# Patient Record
Sex: MALE | Race: WHITE
[De-identification: names, ages, dates, MRNs, and addresses within clinical notes are randomized per-mention and may not be internally consistent; named-entity substitution may affect disease eponyms.]

---

## 2017-04-24 ENCOUNTER — HOSPITAL ENCOUNTER (INPATIENT)
Dept: HOSPITAL 17 - PHED | Age: 62
LOS: 2 days | Discharge: HOME | DRG: 871 | End: 2017-04-26
Attending: HOSPITALIST | Admitting: HOSPITALIST
Payer: OTHER GOVERNMENT

## 2017-04-24 VITALS
RESPIRATION RATE: 22 BRPM | HEART RATE: 97 BPM | DIASTOLIC BLOOD PRESSURE: 67 MMHG | OXYGEN SATURATION: 95 % | TEMPERATURE: 96.7 F | SYSTOLIC BLOOD PRESSURE: 114 MMHG

## 2017-04-24 VITALS — WEIGHT: 182.1 LBS | BODY MASS INDEX: 26.97 KG/M2 | HEIGHT: 69 IN

## 2017-04-24 VITALS
OXYGEN SATURATION: 96 % | HEART RATE: 92 BPM | SYSTOLIC BLOOD PRESSURE: 163 MMHG | TEMPERATURE: 98 F | RESPIRATION RATE: 18 BRPM | DIASTOLIC BLOOD PRESSURE: 86 MMHG

## 2017-04-24 VITALS
DIASTOLIC BLOOD PRESSURE: 67 MMHG | OXYGEN SATURATION: 95 % | HEART RATE: 107 BPM | RESPIRATION RATE: 26 BRPM | SYSTOLIC BLOOD PRESSURE: 127 MMHG

## 2017-04-24 VITALS
DIASTOLIC BLOOD PRESSURE: 75 MMHG | RESPIRATION RATE: 28 BRPM | SYSTOLIC BLOOD PRESSURE: 147 MMHG | TEMPERATURE: 98.3 F | OXYGEN SATURATION: 99 % | HEART RATE: 127 BPM

## 2017-04-24 VITALS
DIASTOLIC BLOOD PRESSURE: 65 MMHG | HEART RATE: 109 BPM | RESPIRATION RATE: 28 BRPM | OXYGEN SATURATION: 94 % | SYSTOLIC BLOOD PRESSURE: 137 MMHG

## 2017-04-24 VITALS
SYSTOLIC BLOOD PRESSURE: 161 MMHG | TEMPERATURE: 98.5 F | OXYGEN SATURATION: 89 % | HEART RATE: 122 BPM | RESPIRATION RATE: 36 BRPM | DIASTOLIC BLOOD PRESSURE: 90 MMHG

## 2017-04-24 VITALS
HEART RATE: 97 BPM | OXYGEN SATURATION: 95 % | SYSTOLIC BLOOD PRESSURE: 139 MMHG | RESPIRATION RATE: 18 BRPM | DIASTOLIC BLOOD PRESSURE: 70 MMHG

## 2017-04-24 VITALS
SYSTOLIC BLOOD PRESSURE: 143 MMHG | HEART RATE: 127 BPM | DIASTOLIC BLOOD PRESSURE: 72 MMHG | OXYGEN SATURATION: 99 % | RESPIRATION RATE: 28 BRPM

## 2017-04-24 VITALS — OXYGEN SATURATION: 93 %

## 2017-04-24 VITALS — OXYGEN SATURATION: 95 %

## 2017-04-24 DIAGNOSIS — K74.60: ICD-10-CM

## 2017-04-24 DIAGNOSIS — J18.9: ICD-10-CM

## 2017-04-24 DIAGNOSIS — J44.0: ICD-10-CM

## 2017-04-24 DIAGNOSIS — J44.1: ICD-10-CM

## 2017-04-24 DIAGNOSIS — F17.210: ICD-10-CM

## 2017-04-24 DIAGNOSIS — R65.20: ICD-10-CM

## 2017-04-24 DIAGNOSIS — J98.11: ICD-10-CM

## 2017-04-24 DIAGNOSIS — A41.9: Primary | ICD-10-CM

## 2017-04-24 DIAGNOSIS — K21.9: ICD-10-CM

## 2017-04-24 DIAGNOSIS — I10: ICD-10-CM

## 2017-04-24 DIAGNOSIS — E03.9: ICD-10-CM

## 2017-04-24 LAB
ALP SERPL-CCNC: 83 U/L (ref 45–117)
ALT SERPL-CCNC: 31 U/L (ref 12–78)
ANION GAP SERPL CALC-SCNC: 6 MEQ/L (ref 5–15)
AST SERPL-CCNC: 22 U/L (ref 15–37)
BASOPHILS # BLD AUTO: 0.2 TH/MM3 (ref 0–0.2)
BASOPHILS NFR BLD: 1.4 % (ref 0–2)
BILIRUB SERPL-MCNC: 1.6 MG/DL (ref 0.2–1)
BUN SERPL-MCNC: 18 MG/DL (ref 7–18)
CHLORIDE SERPL-SCNC: 106 MEQ/L (ref 98–107)
EOSINOPHIL # BLD: 0 TH/MM3 (ref 0–0.4)
EOSINOPHIL NFR BLD: 0.3 % (ref 0–4)
ERYTHROCYTE [DISTWIDTH] IN BLOOD BY AUTOMATED COUNT: 11.9 % (ref 11.6–17.2)
GFR SERPLBLD BASED ON 1.73 SQ M-ARVRAT: 62 ML/MIN (ref 89–?)
HCO3 BLD-SCNC: 27.6 MEQ/L (ref 21–32)
HCT VFR BLD CALC: 42.8 % (ref 39–51)
HEMO FLAGS: (no result)
LACTIC ACID GHOST: (no result)
LYMPHOCYTES # BLD AUTO: 3.8 TH/MM3 (ref 1–4.8)
LYMPHOCYTES NFR BLD AUTO: 24.1 % (ref 9–44)
MCH RBC QN AUTO: 31.5 PG (ref 27–34)
MCHC RBC AUTO-ENTMCNC: 34.1 % (ref 32–36)
MCV RBC AUTO: 92.5 FL (ref 80–100)
MONOCYTES NFR BLD: 5.7 % (ref 0–8)
NEUTROPHILS # BLD AUTO: 10.7 TH/MM3 (ref 1.8–7.7)
NEUTROPHILS NFR BLD AUTO: 68.5 % (ref 16–70)
PLATELET # BLD: 311 TH/MM3 (ref 150–450)
POTASSIUM SERPL-SCNC: 4.4 MEQ/L (ref 3.5–5.1)
RBC # BLD AUTO: 4.63 MIL/MM3 (ref 4.5–5.9)
SODIUM SERPL-SCNC: 140 MEQ/L (ref 136–145)
WBC # BLD AUTO: 15.6 TH/MM3 (ref 4–11)

## 2017-04-24 PROCEDURE — 93005 ELECTROCARDIOGRAM TRACING: CPT

## 2017-04-24 PROCEDURE — 80048 BASIC METABOLIC PNL TOTAL CA: CPT

## 2017-04-24 PROCEDURE — 94664 DEMO&/EVAL PT USE INHALER: CPT

## 2017-04-24 PROCEDURE — 94640 AIRWAY INHALATION TREATMENT: CPT

## 2017-04-24 PROCEDURE — 96375 TX/PRO/DX INJ NEW DRUG ADDON: CPT

## 2017-04-24 PROCEDURE — 87804 INFLUENZA ASSAY W/OPTIC: CPT

## 2017-04-24 PROCEDURE — 84484 ASSAY OF TROPONIN QUANT: CPT

## 2017-04-24 PROCEDURE — 71010: CPT

## 2017-04-24 PROCEDURE — 87040 BLOOD CULTURE FOR BACTERIA: CPT

## 2017-04-24 PROCEDURE — 96361 HYDRATE IV INFUSION ADD-ON: CPT

## 2017-04-24 PROCEDURE — 83605 ASSAY OF LACTIC ACID: CPT

## 2017-04-24 PROCEDURE — 80053 COMPREHEN METABOLIC PANEL: CPT

## 2017-04-24 PROCEDURE — 96374 THER/PROPH/DIAG INJ IV PUSH: CPT

## 2017-04-24 PROCEDURE — 85025 COMPLETE CBC W/AUTO DIFF WBC: CPT

## 2017-04-24 RX ADMIN — LEVOFLOXACIN SCH MG: 750 TABLET, FILM COATED ORAL at 18:42

## 2017-04-24 RX ADMIN — METHYLPREDNISOLONE SODIUM SUCCINATE SCH MG: 40 INJECTION, POWDER, FOR SOLUTION INTRAMUSCULAR; INTRAVENOUS at 19:57

## 2017-04-24 RX ADMIN — ENOXAPARIN SODIUM SCH MG: 40 INJECTION SUBCUTANEOUS at 18:42

## 2017-04-24 RX ADMIN — IPRATROPIUM BROMIDE AND ALBUTEROL SULFATE SCH AMPULE: .5; 3 SOLUTION RESPIRATORY (INHALATION) at 19:47

## 2017-04-24 RX ADMIN — Medication SCH ML: at 19:58

## 2017-04-24 RX ADMIN — IPRATROPIUM BROMIDE AND ALBUTEROL SULFATE SCH AMPULE: .5; 3 SOLUTION RESPIRATORY (INHALATION) at 13:43

## 2017-04-24 RX ADMIN — CEFTRIAXONE SODIUM SCH MLS/HR: 2 INJECTION, POWDER, FOR SOLUTION INTRAMUSCULAR; INTRAVENOUS at 21:06

## 2017-04-24 NOTE — PD
HPI


Chief Complaint:  Respiratory Symptoms


Time Seen by Provider:  13:37


Travel History


International Travel<30 days:  No


Contact w/Intl Traveler<30days:  No





History of Present Illness


HPI


This is a 61-year-old male who has a history of COPD who presents to the 

emergency department with increasing shortness of breath over the past 2 days, 

constant, moderate severity associated with a productive cough with yellow 

sputum and some subjective fevers and chills.  He says a family member was sick 

with similar symptoms.  He denies any chest pain.





PFSH


Past Medical History


Hx Anticoagulant Therapy:  No


Arthritis:  Yes


Autoimmune Disease:  Yes (LUPUS)


Cancer:  Yes (ABDOMINAL, right hand middle finger REMOVED)


Cardiovascular Problems:  No


Chemotherapy:  No


Cirrhosis:  Yes


COPD:  Yes


Cerebrovascular Accident:  No


Diabetes:  No


Diminished Hearing:  No


GERD:  Yes


Hepatitis:  No


Hiatal Hernia:  Yes


Hypertension:  Yes


Respiratory:  Yes (COPD)


Thyroid Disease:  Yes


PNEUMOCCOCAL Vaccine (Year):  2011





Past Surgical History


Abdominal Surgery:  Yes (WHIPPLE DID INCLUDE SPLENECTOMY,PART OF PANCREAS,ALSO 

HAD HERNIA REPAIR)


Appendectomy:  Yes


Cholecystectomy:  Yes


Genitourinary Surgery:  Yes (PROSTATE SURGERY)


Pacemaker:  No


Other Surgery:  Yes (CANCEROUS SKIN LESIONS REMOVED FROM UPPER BACK AND ARMS 

2014)





Social History


Alcohol Use:  No (QUIT IN 2008)


Tobacco Use:  Yes (1 ppd)


Substance Use:  No





Allergies-Medications


(Allergen,Severity, Reaction):  


Uncoded Allergies:  


     STEROIDS (Adverse Reaction, Severe, 2/17/14)


 SEVERE CRAMPING IN LEGS AND ARMS


Reported Meds & Prescriptions





Reported Meds & Active Scripts


Active


Reported


Aspirin 81 Mg Tabdr 81 Mg PO DAILY


Ventolin Hfa 18 GM Inh (Albuterol Sulfate) 90 Mcg/Act Aer 2 Puff INH Q4-6H PRN


Synthroid (Levothyroxine Sodium) 50 Mcg Tab 50 Mcg PO DAILY








Review of Systems


ROS Limitations:  Clinical Condition





Physical Exam


Narrative


GENERAL: Moderate respiratory distress


SKIN: Focused skin assessment warm and dry.


HEAD: Atraumatic. Normocephalic. 


EYES: Pupils equal and round.  No injection or drainage. 


ENT:  Moist mucous membranes


NECK: Trachea midline. 


CARDIOVASCULAR: Regular rate and rhythm.  No murmur appreciated.


RESPIRATORY: Diffuse wheezing with accessory muscle use, speaking 2-3 word 

sentences


GASTROINTESTINAL: Abdomen soft, non-tender, nondistended. 


MUSCULOSKELETAL: No obvious deformities. 


NEUROLOGICAL: Awake and alert. No obvious cranial nerve deficits.  Moving all 

extremities.


PSYCHIATRIC: Appropriate mood and affect; insight and judgment normal.





Data


Data


Last Documented VS





Vital Signs








  Date Time  Temp Pulse Resp B/P Pulse Ox O2 Delivery O2 Flow Rate FiO2


 


4/24/17 14:40  109 28 137/65 94 Nasal Cannula 3 


 


4/24/17 14:15 98.3       








Orders





 Complete Blood Count With Diff (4/24/17 13:37)


Comprehensive Metabolic Panel (4/24/17 13:37)


Troponin I (4/24/17 13:37)


Iv Access Insert/Monitor (4/24/17 13:37)


Electrocardiogram (4/24/17 13:37)


Ecg Monitoring (4/24/17 13:37)


Oximetry (4/24/17 13:37)


Oxygen Administration (4/24/17 13:37)


Chest, Single Ap (4/24/17 13:37)


Sodium Chloride 0.9% Flush (Ns Flush) (4/24/17 13:45)


Methylprednisolone So Succ Inj (Solumedr (4/24/17 13:45)


Albuterol-Ipratropium Neb (Duoneb Neb) (4/24/17 13:45)


Sodium Chlor 0.9% 1000 Ml Inj (Ns 1000 M (4/24/17 13:45)


Albuterol-Ipratropium Neb (Duoneb Neb) (4/24/17 13:39)


Azithromycin Inj (Zithromax Inj) (4/24/17 15:15)


Lactic Acid (4/24/17 15:10)


Blood Culture (4/24/17 15:10)


Sodium Chlor 0.9% 1000 Ml Inj (Ns 1000 M (4/24/17 15:15)





Labs








 Laboratory Tests








Test 4/24/17





 13:30


 


White Blood Count 15.6 TH/MM3


 


Red Blood Count 4.63 MIL/MM3


 


Hemoglobin 14.6 GM/DL


 


Hematocrit 42.8 %


 


Mean Corpuscular Volume 92.5 FL


 


Mean Corpuscular Hemoglobin 31.5 PG


 


Mean Corpuscular Hemoglobin 34.1 %





Concent 


 


Red Cell Distribution Width 11.9 %


 


Platelet Count 311 TH/MM3


 


Mean Platelet Volume 8.4 FL


 


Neutrophils (%) (Auto) 68.5 %


 


Lymphocytes (%) (Auto) 24.1 %


 


Monocytes (%) (Auto) 5.7 %


 


Eosinophils (%) (Auto) 0.3 %


 


Basophils (%) (Auto) 1.4 %


 


Neutrophils # (Auto) 10.7 TH/MM3


 


Lymphocytes # (Auto) 3.8 TH/MM3


 


Monocytes # (Auto) 0.9 TH/MM3


 


Eosinophils # (Auto) 0.0 TH/MM3


 


Basophils # (Auto) 0.2 TH/MM3


 


CBC Comment DIFF FINAL 


 


Differential Comment  


 


Sodium Level 140 MEQ/L


 


Potassium Level 4.4 MEQ/L


 


Chloride Level 106 MEQ/L


 


Carbon Dioxide Level 27.6 MEQ/L


 


Anion Gap 6 MEQ/L


 


Blood Urea Nitrogen 18 MG/DL


 


Creatinine 1.20 MG/DL


 


Estimat Glomerular Filtration 62 ML/MIN





Rate 


 


Random Glucose 102 MG/DL


 


Calcium Level 9.0 MG/DL


 


Total Bilirubin 1.6 MG/DL


 


Aspartate Amino Transf 22 U/L





(AST/SGOT) 


 


Alanine Aminotransferase 31 U/L





(ALT/SGPT) 


 


Alkaline Phosphatase 83 U/L


 


Troponin I LESS THAN 0.02





 NG/ML


 


Total Protein 8.7 GM/DL


 


Albumin 4.1 GM/DL














MDM


Medical Decision Making


Medical Screen Exam Complete:  Yes


Emergency Medical Condition:  Yes


Interpretation(s)


Afebrile, tachycardic, tachypneic, hypertensive, hypoxic


Leukocytosis


Electrolytes are reassuring


Troponin is normal





Last 24 hours Impressions








Chest X-Ray 4/24/17 1337 Signed





Impressions: 





 Service Date/Time:  Monday, April 24, 2017 13:57 - CONCLUSION:  1. Minimal 





 atelectasis or scarring in the left lingular region. 2. Otherwise, no acute 





 cardiopulmonary process.     Bernardino Watson MD 








Differential Diagnosis


Pneumonia, COPD exacerbation, viral syndrome, influenza, pulmonary embolism


Narrative Course


This is a 61-year-old male who presents to the emergency department with 

shortness of breath.  He has a history of COPD.  On arrival he had accessory 

muscle use and was diffusely wheezing.  He was given serial DuoNeb's and IV 

steroids.  His symptoms improved significantly.  He continues to be 89 and 90% 

on room air.  I think he would benefit from admission for continued respiratory 

management.  He does have a leukocytosis of 15 which I suspect may be 

demargination in the setting of respiratory distress, or secondary to e a viral 

syndrome.  There is no evidence of pneumonia on chest x-ray.  He was covered 

with azithromycin in the setting of increased sputum production.





Diagnosis





 Primary Impression:  


 COPD exacerbation





Admitting Information


Admitting Physician Requests:  Admit








Keisha Pino MD Apr 24, 2017 13:41

## 2017-04-24 NOTE — RADHPO
EXAM DATE/TIME:  04/24/2017 13:57 

 

[facility] COMPARISON:     

CHEST SINGLE AP, October 29, 2016, 19:53.

 

                     

INDICATIONS :     

Shortness of breath and cough. 

                     

 

MEDICAL HISTORY :     

Hypertension.  Chronic obstructive pulmonary disease.  Lupus.      

 

SURGICAL HISTORY :        

Whipple. Hiatal hernia repair. 

 

ENCOUNTER:     

Initial                                        

 

ACUITY:     

3 days      

 

PAIN SCORE:     

0/10

 

LOCATION:     

Bilateral chest 

 

FINDINGS:     

A single view of the chest demonstrates the lungs to be symmetrically aerated without evidence of mas
s, infiltrate or effusion.  Minimal atelectasis or scarring in the region of the left lingula. No eff
usion. The cardiomediastinal contours are unremarkable.  Osseous structures are intact. Anterior fixa
tion of the lower cervical spine.

 

CONCLUSION:     

1. Minimal atelectasis or scarring in the left lingular region.

2. Otherwise, no acute cardiopulmonary process.

 

 

 

 Bernardino Watson MD on April 24, 2017 at 15:01           

Board Certified Radiologist.

 This report was verified electronically.

## 2017-04-25 VITALS
DIASTOLIC BLOOD PRESSURE: 78 MMHG | SYSTOLIC BLOOD PRESSURE: 161 MMHG | RESPIRATION RATE: 20 BRPM | OXYGEN SATURATION: 94 % | TEMPERATURE: 97.9 F | HEART RATE: 95 BPM

## 2017-04-25 VITALS — OXYGEN SATURATION: 92 %

## 2017-04-25 VITALS
DIASTOLIC BLOOD PRESSURE: 81 MMHG | OXYGEN SATURATION: 95 % | SYSTOLIC BLOOD PRESSURE: 157 MMHG | TEMPERATURE: 96.1 F | HEART RATE: 89 BPM | RESPIRATION RATE: 20 BRPM

## 2017-04-25 VITALS
DIASTOLIC BLOOD PRESSURE: 80 MMHG | TEMPERATURE: 96.8 F | RESPIRATION RATE: 20 BRPM | SYSTOLIC BLOOD PRESSURE: 143 MMHG | OXYGEN SATURATION: 96 % | HEART RATE: 83 BPM

## 2017-04-25 VITALS — OXYGEN SATURATION: 96 %

## 2017-04-25 VITALS — OXYGEN SATURATION: 93 %

## 2017-04-25 VITALS
TEMPERATURE: 96.5 F | DIASTOLIC BLOOD PRESSURE: 66 MMHG | HEART RATE: 99 BPM | OXYGEN SATURATION: 93 % | SYSTOLIC BLOOD PRESSURE: 116 MMHG | RESPIRATION RATE: 20 BRPM

## 2017-04-25 VITALS
RESPIRATION RATE: 20 BRPM | SYSTOLIC BLOOD PRESSURE: 131 MMHG | HEART RATE: 107 BPM | DIASTOLIC BLOOD PRESSURE: 74 MMHG | TEMPERATURE: 99.1 F | OXYGEN SATURATION: 93 %

## 2017-04-25 VITALS
OXYGEN SATURATION: 96 % | HEART RATE: 90 BPM | RESPIRATION RATE: 20 BRPM | DIASTOLIC BLOOD PRESSURE: 64 MMHG | TEMPERATURE: 96.7 F | SYSTOLIC BLOOD PRESSURE: 121 MMHG

## 2017-04-25 LAB
ANION GAP SERPL CALC-SCNC: 9 MEQ/L (ref 5–15)
BASOPHILS # BLD AUTO: 0.5 TH/MM3 (ref 0–0.2)
BASOPHILS NFR BLD: 3.5 % (ref 0–2)
BUN SERPL-MCNC: 24 MG/DL (ref 7–18)
CHLORIDE SERPL-SCNC: 107 MEQ/L (ref 98–107)
EOSINOPHIL # BLD: 0 TH/MM3 (ref 0–0.4)
EOSINOPHIL NFR BLD: 0.2 % (ref 0–4)
ERYTHROCYTE [DISTWIDTH] IN BLOOD BY AUTOMATED COUNT: 12.6 % (ref 11.6–17.2)
GFR SERPLBLD BASED ON 1.73 SQ M-ARVRAT: 68 ML/MIN (ref 89–?)
HCO3 BLD-SCNC: 25.9 MEQ/L (ref 21–32)
HCT VFR BLD CALC: 35.7 % (ref 39–51)
HEMO FLAGS: (no result)
LYMPHOCYTES # BLD AUTO: 2 TH/MM3 (ref 1–4.8)
LYMPHOCYTES NFR BLD AUTO: 14.5 % (ref 9–44)
MCH RBC QN AUTO: 32.1 PG (ref 27–34)
MCHC RBC AUTO-ENTMCNC: 34.3 % (ref 32–36)
MCV RBC AUTO: 93.5 FL (ref 80–100)
MONOCYTES NFR BLD: 4.5 % (ref 0–8)
NEUTROPHILS # BLD AUTO: 10.5 TH/MM3 (ref 1.8–7.7)
NEUTROPHILS NFR BLD AUTO: 77.3 % (ref 16–70)
PLATELET # BLD: 274 TH/MM3 (ref 150–450)
POTASSIUM SERPL-SCNC: 3.8 MEQ/L (ref 3.5–5.1)
RBC # BLD AUTO: 3.81 MIL/MM3 (ref 4.5–5.9)
SODIUM SERPL-SCNC: 142 MEQ/L (ref 136–145)
WBC # BLD AUTO: 13.6 TH/MM3 (ref 4–11)

## 2017-04-25 RX ADMIN — IPRATROPIUM BROMIDE AND ALBUTEROL SULFATE SCH AMPULE: .5; 3 SOLUTION RESPIRATORY (INHALATION) at 07:35

## 2017-04-25 RX ADMIN — ASPIRIN SCH MG: 81 TABLET ORAL at 09:38

## 2017-04-25 RX ADMIN — IPRATROPIUM BROMIDE AND ALBUTEROL SULFATE SCH AMPULE: .5; 3 SOLUTION RESPIRATORY (INHALATION) at 19:24

## 2017-04-25 RX ADMIN — CEFTRIAXONE SODIUM SCH MLS/HR: 2 INJECTION, POWDER, FOR SOLUTION INTRAMUSCULAR; INTRAVENOUS at 21:31

## 2017-04-25 RX ADMIN — METHYLPREDNISOLONE SODIUM SUCCINATE SCH MG: 40 INJECTION, POWDER, FOR SOLUTION INTRAMUSCULAR; INTRAVENOUS at 09:38

## 2017-04-25 RX ADMIN — IPRATROPIUM BROMIDE AND ALBUTEROL SULFATE SCH AMPULE: .5; 3 SOLUTION RESPIRATORY (INHALATION) at 15:02

## 2017-04-25 RX ADMIN — METHYLPREDNISOLONE SODIUM SUCCINATE SCH MG: 40 INJECTION, POWDER, FOR SOLUTION INTRAMUSCULAR; INTRAVENOUS at 02:12

## 2017-04-25 RX ADMIN — IPRATROPIUM BROMIDE AND ALBUTEROL SULFATE SCH AMPULE: .5; 3 SOLUTION RESPIRATORY (INHALATION) at 11:27

## 2017-04-25 RX ADMIN — ENOXAPARIN SODIUM SCH MG: 40 INJECTION SUBCUTANEOUS at 17:03

## 2017-04-25 RX ADMIN — Medication SCH ML: at 09:00

## 2017-04-25 RX ADMIN — LEVOTHYROXINE SODIUM SCH MCG: 50 TABLET ORAL at 05:17

## 2017-04-25 RX ADMIN — LEVOFLOXACIN SCH MG: 750 TABLET, FILM COATED ORAL at 17:03

## 2017-04-25 RX ADMIN — Medication SCH ML: at 21:31

## 2017-04-25 NOTE — HHI.HP
__________________________________________________





HPI


Service


[facility]


Primary Care Physician


No Primary Care Physician


Admission Diagnosis


copd exacerbation


Diagnoses:  


Chief Complaint:  


Fever, cough, shortness of breath.


Travel History


International Travel<30 Days:  No


Contact w/Intl Traveler <30 Da:  No


Traveled to Known Affected Are:  No





Sepsis Criteria


SIRS Criteria (2 or more):  Heart rate over 90, RR  > 20 or PaCO2 < 32, WBC > 

26215, < 4000 or > 10% bands


Sepsis Criteria (SIRS+source):  Infect source susp/known


Severe Sepsis (+one):  Lactate >2


Criteria Outcome:  Meets SIRS criteria, Meets sepsis criteria, Meets severe 

sepsis criteria


History of Present Illness


Mr. Cruz is a pleasant 61 year old  who presented to the ED on 4/24/ 2017 with productive cough, fever, shortness of breath that started on Saturday 4/22/2017. He reports temp of 101 and also had diarrhea. He denies any chest 

pain. No changes in bladder movements. His family member was sick with similar 

symptoms.  On arrival, Temp 98.5, , Resp 36, /90, 89% sat on room 

air. WBC 15.9K, Lactic acid 3.0 and then 4.9.





Review of Systems


Except as stated in HPI:  all other systems reviewed are Neg





Past Family Social History


Past Medical History


COPD, Hypothyroidism


Past Surgical History


Whipple procedure


Neck surgery


Finger cancer surgery


Hernia surgery


Reported Medications


Aspirin 81 Mg Tabdr 81 Mg PO DAILY


Ventolin Hfa 18 GM Inh (Albuterol Sulfate) 90 Mcg/Act Aer 2 Puff INH Q4-6H PRN


Synthroid (Levothyroxine Sodium) 50 Mcg Tab 50 Mcg PO DAILY


Allergies:  


Coded Allergies:  


     No Known Allergies (Unverified , 4/24/17)


Family History


Mother - dementia


Father - Heart attack


Social History


Smokes 1 ppd, denies using alcohol





Physical Exam


Vital Signs





 Vital Signs








  Date Time  Temp Pulse Resp B/P Pulse Ox O2 Delivery O2 Flow Rate FiO2


 


4/25/17 08:10     93 Nasal Cannula 1.00 


 


4/25/17 08:00 96.8 83 20 143/80 96   


 


4/25/17 07:39     96 Nasal Cannula 2.00 


 


4/25/17 04:00 96.1 89 20 157/81 95   


 


4/25/17 00:00 96.7 90 20 121/64 96   


 


4/24/17 20:00 96.7 97 22 114/67 95   


 


4/24/17 19:48     93 Nasal Cannula 2.00 


 


4/24/17 18:00 98.0 92 18 163/86 96   


 


4/24/17 17:17     95 Nasal Cannula 3.00 


 


4/24/17 17:10  97 18 139/70 95 Nasal Cannula 3 


 


4/24/17 15:44  107 26 127/67 95 Nasal Cannula 3 


 


4/24/17 14:40  109 28 137/65 94 Nasal Cannula 3 


 


4/24/17 14:15 98.3 127 28 147/75 99 Nasal Cannula 3 


 


4/24/17 14:03  127 28 143/72 99 Nasal Cannula 3 


 


4/24/17 13:53  122   89 Nasal Cannula 3 


 


4/24/17 13:53     93 Nasal Cannula  


 


4/24/17 13:53     93 Nasal Cannula 3 


 


4/24/17 13:44 98.5 122 36 161/90 89   


 


4/24/17 13:40     93 Nasal Cannula 3.00 








Physical Exam


GENERAL: This is a well-nourished, well-developed patient, in no apparent 

distress.


SKIN: No rashes, ecchymoses or lesions. Warm and dry.


HEAD: Atraumatic. Normocephalic. No temporal or scalp tenderness.


EYES: Pupils equal round and reactive. No injection or drainage. 


ENT: Nose without bleeding, purulent drainage or septal hematoma. Airway patent.


NECK: Trachea midline. No lymphadenopathy. Supple, nontender, no meningeal 

signs.


CARDIOVASCULAR: Regular rhythm, tachycardia without murmurs, gallops, or rubs. 

No JVD. 


RESPIRATORY: Moderate air entry, No appreciable wheezing, crackles. 


GASTROINTESTINAL: Abdomen soft, non-tender, nondistended. No guarding.


MUSCULOSKELETAL: Extremities without clubbing, cyanosis, or edema. 


NEUROLOGICAL: Awake and alert. Cranial nerves II through XII intact. No focal 

neurological deficits. Normal speech.


Laboratory





Laboratory Tests








Test 4/24/17 4/24/17 4/24/17 4/24/17





 13:30 15:04 20:44 23:00


 


White Blood Count 15.6    


 


Red Blood Count 4.63    


 


Hemoglobin 14.6    


 


Hematocrit 42.8    


 


Mean Corpuscular Volume 92.5    


 


Mean Corpuscular Hemoglobin 31.5    


 


Mean Corpuscular Hemoglobin 34.1    





Concent    


 


Red Cell Distribution Width 11.9    


 


Platelet Count 311    


 


Mean Platelet Volume 8.4    


 


Neutrophils (%) (Auto) 68.5    


 


Lymphocytes (%) (Auto) 24.1    


 


Monocytes (%) (Auto) 5.7    


 


Eosinophils (%) (Auto) 0.3    


 


Basophils (%) (Auto) 1.4    


 


Neutrophils # (Auto) 10.7    


 


Lymphocytes # (Auto) 3.8    


 


Monocytes # (Auto) 0.9    


 


Eosinophils # (Auto) 0.0    


 


Basophils # (Auto) 0.2    


 


CBC Comment DIFF FINAL    


 


Differential Comment     


 


Sodium Level 140    


 


Potassium Level 4.4    


 


Chloride Level 106    


 


Carbon Dioxide Level 27.6    


 


Anion Gap 6    


 


Blood Urea Nitrogen 18    


 


Creatinine 1.20    


 


Estimat Glomerular Filtration 62    





Rate    


 


Random Glucose 102    


 


Calcium Level 9.0    


 


Total Bilirubin 1.6    


 


Aspartate Amino Transf 22    





(AST/SGOT)    


 


Alanine Aminotransferase 31    





(ALT/SGPT)    


 


Alkaline Phosphatase 83    


 


Troponin I LESS THAN 0.02    


 


Total Protein 8.7    


 


Albumin 4.1    


 


Lactic Acid Level  3.0  4.9  3.7 














 Date/Time Procedure Status





Source Growth 


 


 4/24/17 15:35 Influenza Types A,B Antigen (LUIS EDUARDO) - Final Complete





Nasal Washing NEGATIVE FOR FLU A AND B ANTIGEN.... 


 


 4/24/17 13:35 Aerobic Blood Culture Received





Blood Peripheral Pending 





 4/24/17 13:35 Anaerobic Blood Culture Received





Blood Peripheral Pending 








Result Diagram:  


4/24/17 1330                                                                   

             4/24/17 1330





Imaging





Last Impressions








Chest X-Ray 4/24/17 1337 Signed





Impressions: 





 Service Date/Time:  Monday, April 24, 2017 13:57 - CONCLUSION:  1. Minimal 





 atelectasis or scarring in the left lingular region. 2. Otherwise, no acute 





 cardiopulmonary process.     Bernardino Watson MD 











Assessment and Plan


Problem List:  


(1) Severe sepsis


ICD Code:  A41.9


Status:  Acute


(2) COPD exacerbation


ICD Code:  J44.1


Status:  Acute


(3) Hypothyroidism


ICD Code:  E03.9


Status:  Acute


Assessment and Plan


Mr. Cruz is a pleasant 61 year old  with a history of COPD who 

presented to the ED with productive cough, fever, shortness of breath. Lactic 

acid was 3.0, 4.9 on the day of admission. WBC 15.9. He was tachycardic and 

tachypneic. 





- Severe sepsis likely due to pneumonia


- Probable pneumonia


- Probable COPD exacerbation


   - DuoNeb Q4hrs while awake and Q2hrs PRN. Also start Symbicort. 


   - Solu-medrol 40mg Q6hrs and can be switched to PO Prednisone later.


   - Ceftriaxone 2g IV Q24 hrs and Levaquin 750mg PO Qday. 


   - Follow Lactic acid.


   - Supplemental O2 to keep O2 sat > 90%. 





- Hypothyroidism - Continue Levothyroxine 50mcg Qday. 





- Hypertension - Clonidine and Hydralazine PRN





Full code. Lovenox.





Physician Certification


2 Midnight Certification Type:  Admission for Inpatient Services


Order for Inpatient Services


The services are ordered in accordance with Medicare regulations or non-

Medicare payer requirements, as applicable.  In the case of services not 

specified as inpatient-only, they are appropriately provided as inpatient 

services in accordance with the 2-midnight benchmark.


Estimated LOS (days):  2


 days is the estimated time the patient will need to remain in the hospital, 

assuming treatment plan goals are met and no additional complications.


Post-Hospital Plan:  Home


Notes:


Patient improved well. Rest of the treatments can be done outpatient.








Nicolasa Carlos DO Apr 25, 2017 09:53

## 2017-04-25 NOTE — EKG
Date Performed: 04/24/2017       Time Performed: 13:29:50

 

PTAGE:      61 years

 

EKG:      Sinus tachycardia with PVC(s) Leftward axis ST junctional depression is nonspecific Borderl
ine ECG

 

PREVIOUS TRACING       : 10/29/2016 19.39 Compared to previous tracing, heart rate has increased.

 

DOCTOR:   Wade Shine  Interpretating Date/Time  04/25/2017 09:05:26

## 2017-04-26 VITALS
OXYGEN SATURATION: 90 % | HEART RATE: 84 BPM | RESPIRATION RATE: 18 BRPM | SYSTOLIC BLOOD PRESSURE: 191 MMHG | DIASTOLIC BLOOD PRESSURE: 83 MMHG | TEMPERATURE: 97.1 F

## 2017-04-26 VITALS — OXYGEN SATURATION: 95 %

## 2017-04-26 VITALS
HEART RATE: 91 BPM | DIASTOLIC BLOOD PRESSURE: 76 MMHG | SYSTOLIC BLOOD PRESSURE: 125 MMHG | TEMPERATURE: 98.3 F | OXYGEN SATURATION: 93 % | RESPIRATION RATE: 20 BRPM

## 2017-04-26 RX ADMIN — IPRATROPIUM BROMIDE AND ALBUTEROL SULFATE SCH AMPULE: .5; 3 SOLUTION RESPIRATORY (INHALATION) at 07:30

## 2017-04-26 RX ADMIN — ASPIRIN SCH MG: 81 TABLET ORAL at 08:59

## 2017-04-26 RX ADMIN — LEVOTHYROXINE SODIUM SCH MCG: 50 TABLET ORAL at 04:59

## 2017-04-26 NOTE — HHI.PR
Subjective


Remarks


Follow up for severe sepsis, pneumonia/COPD exacerbation. Mr. Cruz is doing 

well. No acute concerns. No fever, chills. Currently on room air.





Objective


Vitals





 Vital Signs








  Date Time  Temp Pulse Resp B/P Pulse Ox O2 Delivery O2 Flow Rate FiO2


 


4/26/17 08:21 97.1 84 18 191/83 90   


 


4/26/17 07:32     95   21


 


4/26/17 00:00 98.3 91 20 125/76 93   


 


4/25/17 20:00 96.5 99 20 116/66 93   


 


4/25/17 19:25     92   21


 


4/25/17 16:00 99.1 107 20 131/74 93   


 


4/25/17 12:00 97.9 95 20 161/78 94   








 I/O








 4/25/17 4/25/17 4/25/17 4/26/17 4/26/17 4/26/17





 07:00 15:00 23:00 07:00 15:00 23:00


 


Intake Total 240 ml 840 ml 480 ml 480 ml  


 


Balance 240 ml 840 ml 480 ml 480 ml  


 


      


 


Intake Oral 240 ml 840 ml 480 ml 480 ml  


 


# Voids 2 2 2 2  


 


# Bowel Movements 0  0 0  








Result Diagram:  


4/25/17 1115                                                                   

             4/25/17 1115





Imaging





Last Impressions








Chest X-Ray 4/24/17 1337 Signed





Impressions: 





 Service Date/Time:  Monday, April 24, 2017 13:57 - CONCLUSION:  1. Minimal 





 atelectasis or scarring in the left lingular region. 2. Otherwise, no acute 





 cardiopulmonary process.     Bernardino Watson MD 








Objective Remarks


GENERAL: AOX3, NAD. 


SKIN: Warm and dry.


HEAD: Normocephalic.


EYES: No scleral icterus. No injection or drainage. 


NECK: Supple, trachea midline. No JVD or lymphadenopathy.


CARDIOVASCULAR: Regular rate and rhythm without murmurs, gallops, or rubs. 


RESPIRATORY: Moderate air entry, scattered rhonchi. 


GASTROINTESTINAL: Abdomen soft, non-tender, nondistended. 


MUSCULOSKELETAL: No cyanosis, or edema. 


BACK: Nontender without obvious deformity. No CVA tenderness.





Procedures


None.





A/P


Problem List:  


(1) Severe sepsis


ICD Code:  A41.9


Status:  Acute


(2) COPD exacerbation


ICD Code:  J44.1


Status:  Acute


(3) Hypothyroidism


ICD Code:  E03.9


Status:  Acute


Assessment and Plan


Mr. Cruz is a pleasant 61 year old  with a history of COPD who 

presented to the ED with productive cough, fever, shortness of breath. Lactic 

acid was 3.0, 4.9 on the day of admission. WBC 15.9. He was tachycardic and 

tachypneic. 





- Severe sepsis likely due to pneumonia


- Probable pneumonia


- Probable COPD exacerbation


   - DuoNeb Q4hrs while awake and Q2hrs PRN. Also start Symbicort. 


   - Solu-medrol 40mg Q6hrs and can be switched to PO Prednisone later.


   - Ceftriaxone 2g IV Q24 hrs and Levaquin 750mg PO Qday. 


   - Lactic acid trended down. Clinically, patient is doing well, not on any 

supp O2. 


   - Currently on room air. 





- Hypothyroidism - Continue Levothyroxine 50mcg Qday. 





- Hypertension - Clonidine and Hydralazine PRN





Full code. Lovenox.





Discharge patient to home


Condition on discharge: Improved


Heart healthy Diet as tolerated


Ad [facility] activity


Rx written:


- Levaquin 750mg Qday X 5 days.


- Prednisone 20mg BID X 5 days. 


- Symbicort 


- Tramadol 50mg Q8hrs #15





Follow-up with primary care physician within one week.








Nicolasa Carlos DO Apr 26, 2017 08:32

## 2017-05-21 ENCOUNTER — HOSPITAL ENCOUNTER (EMERGENCY)
Dept: HOSPITAL 17 - PHED | Age: 62
Discharge: HOME | End: 2017-05-21
Payer: OTHER GOVERNMENT

## 2017-05-21 VITALS
OXYGEN SATURATION: 97 % | HEART RATE: 69 BPM | SYSTOLIC BLOOD PRESSURE: 154 MMHG | RESPIRATION RATE: 18 BRPM | DIASTOLIC BLOOD PRESSURE: 64 MMHG

## 2017-05-21 VITALS
TEMPERATURE: 97.8 F | SYSTOLIC BLOOD PRESSURE: 138 MMHG | OXYGEN SATURATION: 98 % | DIASTOLIC BLOOD PRESSURE: 83 MMHG | RESPIRATION RATE: 18 BRPM | HEART RATE: 85 BPM

## 2017-05-21 VITALS — HEIGHT: 69 IN | WEIGHT: 176.37 LBS | BODY MASS INDEX: 26.12 KG/M2

## 2017-05-21 DIAGNOSIS — I10: ICD-10-CM

## 2017-05-21 DIAGNOSIS — Z87.39: ICD-10-CM

## 2017-05-21 DIAGNOSIS — R10.13: ICD-10-CM

## 2017-05-21 DIAGNOSIS — K62.5: Primary | ICD-10-CM

## 2017-05-21 DIAGNOSIS — Z86.2: ICD-10-CM

## 2017-05-21 DIAGNOSIS — E07.9: ICD-10-CM

## 2017-05-21 DIAGNOSIS — Z87.19: ICD-10-CM

## 2017-05-21 DIAGNOSIS — Z87.09: ICD-10-CM

## 2017-05-21 DIAGNOSIS — Z98.890: ICD-10-CM

## 2017-05-21 DIAGNOSIS — D64.9: ICD-10-CM

## 2017-05-21 DIAGNOSIS — F17.200: ICD-10-CM

## 2017-05-21 LAB
ANION GAP SERPL CALC-SCNC: 7 MEQ/L (ref 5–15)
APTT BLD: 28.4 SEC (ref 24.3–30.1)
BASOPHILS # BLD AUTO: 0.1 TH/MM3 (ref 0–0.2)
BASOPHILS NFR BLD: 0.8 % (ref 0–2)
BUN SERPL-MCNC: 14 MG/DL (ref 7–18)
CHLORIDE SERPL-SCNC: 104 MEQ/L (ref 98–107)
EOSINOPHIL # BLD: 0.3 TH/MM3 (ref 0–0.4)
EOSINOPHIL NFR BLD: 2 % (ref 0–4)
EOSINOPHIL NFR BLD: 2.1 % (ref 0–4)
ERYTHROCYTE [DISTWIDTH] IN BLOOD BY AUTOMATED COUNT: 12.4 % (ref 11.6–17.2)
GFR SERPLBLD BASED ON 1.73 SQ M-ARVRAT: 62 ML/MIN (ref 89–?)
HCO3 BLD-SCNC: 27.6 MEQ/L (ref 21–32)
HCT VFR BLD CALC: 41.1 % (ref 39–51)
HEMO FLAGS: (no result)
INR PPP: 1 RATIO
LYMPHOCYTES # BLD AUTO: 7.6 TH/MM3 (ref 1–4.8)
LYMPHOCYTES NFR BLD AUTO: 62.9 % (ref 9–44)
MCH RBC QN AUTO: 31.5 PG (ref 27–34)
MCHC RBC AUTO-ENTMCNC: 34.1 % (ref 32–36)
MCV RBC AUTO: 92.2 FL (ref 80–100)
MONOCYTES NFR BLD: 9.5 % (ref 0–8)
NEUTROPHILS # BLD AUTO: 3 TH/MM3 (ref 1.8–7.7)
NEUTROPHILS NFR BLD AUTO: 24.7 % (ref 16–70)
NEUTS BAND # BLD MANUAL: 3.3 TH/MM3 (ref 1.8–7.7)
NEUTS SEG NFR BLD MANUAL: 27 % (ref 16–70)
PLAT MORPH BLD: NORMAL
PLATELET # BLD: 506 TH/MM3 (ref 150–450)
PLATELET BLD QL SMEAR: (no result)
POTASSIUM SERPL-SCNC: 4.1 MEQ/L (ref 3.5–5.1)
PROTHROMBIN TIME: 10.7 SEC (ref 9.8–11.6)
RBC # BLD AUTO: 4.45 MIL/MM3 (ref 4.5–5.9)
RBC MORPH BLD: NORMAL
SCAN/DIFF: (no result)
SODIUM SERPL-SCNC: 139 MEQ/L (ref 136–145)
VARIANT LYMPHS NFR BLD MANUAL: 15 % (ref 0–0)
WBC # BLD AUTO: 12.1 TH/MM3 (ref 4–11)
WBC DIFF SAMPLE: 100

## 2017-05-21 PROCEDURE — 86850 RBC ANTIBODY SCREEN: CPT

## 2017-05-21 PROCEDURE — 86900 BLOOD TYPING SEROLOGIC ABO: CPT

## 2017-05-21 PROCEDURE — 80048 BASIC METABOLIC PNL TOTAL CA: CPT

## 2017-05-21 PROCEDURE — 85007 BL SMEAR W/DIFF WBC COUNT: CPT

## 2017-05-21 PROCEDURE — 86901 BLOOD TYPING SEROLOGIC RH(D): CPT

## 2017-05-21 PROCEDURE — 85027 COMPLETE CBC AUTOMATED: CPT

## 2017-05-21 PROCEDURE — 85730 THROMBOPLASTIN TIME PARTIAL: CPT

## 2017-05-21 PROCEDURE — 85610 PROTHROMBIN TIME: CPT

## 2017-05-21 PROCEDURE — 99284 EMERGENCY DEPT VISIT MOD MDM: CPT

## 2017-05-21 NOTE — PD
HPI


Chief Complaint:  GI Complaint


Time Seen by Provider:  09:06


Travel History


International Travel<30 days:  No


Contact w/Intl Traveler<30days:  No


Traveled to known affect area:  No





History of Present Illness


HPI


61-year-old male with history of lupus status post Whipple and splenectomy for 

cancer here with complaint of GI bleeding and anemia.  Patient has had 

intermittent bright red blood per rectum over the course the last 1-2 weeks.  

[facility] earlier this week showing a 

low hematocrit.  He had our stopped bleeding and so they were going to do 

watchful waiting.  Patient has not had persistent bright red blood per rectum 

for the last 2 days prompting ER visit today.  He notes chronic epigastric 

abdominal pain status post Whipple, this is not worse.  Otherwise no abdominal 

pain, rectal pain.  He denies history of hemorrhoids, fissure.  Last 

colonoscopy was 3 years ago, normal.  No history of upper GI bleed.





PFSH


Past Medical History


Hx Anticoagulant Therapy:  No


Arthritis:  Yes


Autoimmune Disease:  Yes (LUPUS)


Cancer:  Yes (ABDOMINAL, right hand middle finger REMOVED)


Cardiovascular Problems:  No


Chemotherapy:  No


Cirrhosis:  Yes


COPD:  Yes


Cerebrovascular Accident:  No


Diabetes:  No


Diminished Hearing:  No


GERD:  Yes


Genitourinary:  No


Hepatitis:  No


Hiatal Hernia:  Yes


Hypertension:  Yes


Medical other:  Yes (reflux  arthritis  lupus)


Reproductive:  No


Respiratory:  Yes (COPD)


Immunizations Current:  Yes


Thyroid Disease:  Yes


Influenza Vaccination:  Yes


PNEUMOCCOCAL Vaccine (Year):  2011


Pregnant?:  Not Pregnant





Past Surgical History


Abdominal Surgery:  Yes (WHIPPLE DID INCLUDE SPLENECTOMY,PART OF PANCREAS,ALSO 

HAD HERNIA REPAIR)


Appendectomy:  Yes


Cholecystectomy:  Yes


Genitourinary Surgery:  Yes (PROSTATE SURGERY)


Pacemaker:  No


Other Surgery:  Yes (CANCEROUS SKIN LESIONS REMOVED FROM UPPER BACK AND ARMS 

2014)





Social History


Alcohol Use:  No


Tobacco Use:  Yes (1 ppd)


Substance Use:  Yes





Allergies-Medications


(Allergen,Severity, Reaction):  


Coded Allergies:  


     No Known Allergies (Unverified , 5/21/17)


Reported Meds & Prescriptions





Reported Meds & Active Scripts


Active


Symbicort Inh (Budesonide/Formoterol Fumarate) 160-4.5 Mcg/Act Aero 1 Puff INH 

Q12HR


Reported


Aspirin 81 Mg Tabdr 81 Mg PO DAILY


Ventolin Hfa 18 GM Inh (Albuterol Sulfate) 90 Mcg/Act Aer 2 Puff INH Q4-6H PRN


Synthroid (Levothyroxine Sodium) 50 Mcg Tab 50 Mcg PO DAILY








Review of Systems


Except as stated in HPI:  all other systems reviewed are Neg





Physical Exam


Narrative


GENERAL: Well-appearing male in no acute distress


SKIN: Focused skin assessment warm/dry.


HEAD:  Normocephalic. 


EYES:  No scleral icterus. No injection or drainage. 


ENT:   Mucous membranes pink and moist.


NECK: Supple


CARDIOVASCULAR: Regular rate and rhythm.  No murmur appreciated.


RESPIRATORY: No accessory muscle use. Clear to auscultation. Breath sounds 

equal bilaterally. 


GASTROINTESTINAL: Abdomen soft, minimal epigastric abdominal tenderness to 

palpation without rebound or guarding, well-healed old surgical scars.


RECTAL: Normal external rectal examination with deflated external hemorrhoid.  

Digital rectal examination with small internal hemorrhoid, minimally Hemoccult-

positive


MUSCULOSKELETAL: No obvious deformities.  No edema. 


NEUROLOGICAL: Awake and alert. Normal speech.


PSYCHIATRIC: Appropriate mood and affect; insight and judgment normal.





Data


Data


Last Documented VS





Vital Signs








  Date Time  Temp Pulse Resp B/P Pulse Ox O2 Delivery O2 Flow Rate FiO2


 


5/21/17 09:30  69 18 154/64 97 Room Air  


 


5/21/17 09:02 97.8       








Orders





 Basic Metabolic Panel (Bmp) (5/21/17 09:11)


Complete Blood Count With Diff (5/21/17 09:11)


Prothrombin Time / Inr (Pt) (5/21/17 09:11)


Act Partial Throm Time (Ptt) (5/21/17 09:11)


Type And Screen (5/21/17 09:11)


Ecg Monitoring (5/21/17 09:11)


Iv Access Insert/Monitor (5/21/17 09:11)


Oximetry (5/21/17 09:11)


Sodium Chloride 0.9% Flush (Ns Flush) (5/21/17 09:15)





Labs








 Laboratory Tests








Test 5/21/17





 09:15


 


White Blood Count 12.1 TH/MM3


 


Red Blood Count 4.45 MIL/MM3


 


Hemoglobin 14.0 GM/DL


 


Hematocrit 41.1 %


 


Mean Corpuscular Volume 92.2 FL


 


Mean Corpuscular Hemoglobin 31.5 PG


 


Mean Corpuscular Hemoglobin 34.1 %





Concent 


 


Red Cell Distribution Width 12.4 %


 


Platelet Count 506 TH/MM3


 


Mean Platelet Volume 7.4 FL


 


Neutrophils (%) (Auto) 24.7 %


 


Lymphocytes (%) (Auto) 62.9 %


 


Monocytes (%) (Auto) 9.5 %


 


Eosinophils (%) (Auto) 2.1 %


 


Basophils (%) (Auto) 0.8 %


 


Neutrophils # (Auto) 3.0 TH/MM3


 


Lymphocytes # (Auto) 7.6 TH/MM3


 


Monocytes # (Auto) 1.1 TH/MM3


 


Eosinophils # (Auto) 0.3 TH/MM3


 


Basophils # (Auto) 0.1 TH/MM3


 


CBC Comment AUTO DIFF 


 


Prothrombin Time 10.7 SEC


 


Prothromb Time International 1.0 RATIO





Ratio 


 


Activated Partial 28.4 SEC





Thromboplast Time 


 


Sodium Level 139 MEQ/L


 


Potassium Level 4.1 MEQ/L


 


Chloride Level 104 MEQ/L


 


Carbon Dioxide Level 27.6 MEQ/L


 


Anion Gap 7 MEQ/L


 


Blood Urea Nitrogen 14 MG/DL


 


Random Glucose 67 MG/DL


 


Calcium Level 8.9 MG/DL














MDM


Medical Decision Making


Medical Screen Exam Complete:  Yes


Emergency Medical Condition:  Yes


Medical Record Reviewed:  Yes


Differential Diagnosis


61-year-old male with history of cancer status post Whipple, low splenectomy, 

lupus here with complaint of bright red blood per rectum intermittently over 

the last several weeks with low hemoglobin on outside hospital blood work.  

Differential includes GI bleed, hemorrhoid, fissure, diverticula, mass and less 

likely brisk upper GI bleed given his overall stability.


Narrative Course


CBC, CMP, coags, type and screen notable for hemoglobin 14.0.  Otherwise 

unremarkable.  Patient reassured and discharged home to follow-up with GI as 

outpatient for colonoscopy.





[facility] Point of Care


Internal Pos. & Neg. Controls:  Passed


Fecal Specimen Occult Blood:  Positive





Diagnosis





 Primary Impression:  


 Bright red blood per rectum


Referrals:  


Fely Poole MD


call for appointment





Gastroenterologist


call for appointment





***Additional Instructions:


Follow-up with GI for colonoscopy as discussed.  Hemoglobin today was 14.0.


***Med/Other Pt SpecificInfo:  No Change to Meds


Disposition:  01 DISCHARGE HOME


Condition:  Stable








Vansesa Gao MD May 21, 2017 09:15

## 2017-10-20 ENCOUNTER — HOSPITAL ENCOUNTER (EMERGENCY)
Dept: HOSPITAL 17 - PHED | Age: 62
Discharge: HOME | End: 2017-10-20
Payer: OTHER GOVERNMENT

## 2017-10-20 VITALS
TEMPERATURE: 98.1 F | RESPIRATION RATE: 16 BRPM | SYSTOLIC BLOOD PRESSURE: 191 MMHG | HEART RATE: 91 BPM | DIASTOLIC BLOOD PRESSURE: 85 MMHG | OXYGEN SATURATION: 97 %

## 2017-10-20 VITALS
SYSTOLIC BLOOD PRESSURE: 177 MMHG | RESPIRATION RATE: 16 BRPM | DIASTOLIC BLOOD PRESSURE: 94 MMHG | OXYGEN SATURATION: 96 % | HEART RATE: 90 BPM

## 2017-10-20 VITALS — DIASTOLIC BLOOD PRESSURE: 92 MMHG | SYSTOLIC BLOOD PRESSURE: 170 MMHG

## 2017-10-20 VITALS
OXYGEN SATURATION: 96 % | RESPIRATION RATE: 18 BRPM | DIASTOLIC BLOOD PRESSURE: 94 MMHG | SYSTOLIC BLOOD PRESSURE: 176 MMHG | HEART RATE: 85 BPM

## 2017-10-20 VITALS — WEIGHT: 189.6 LBS | BODY MASS INDEX: 28.08 KG/M2 | HEIGHT: 69 IN

## 2017-10-20 DIAGNOSIS — E07.9: ICD-10-CM

## 2017-10-20 DIAGNOSIS — R10.12: Primary | ICD-10-CM

## 2017-10-20 DIAGNOSIS — Z86.2: ICD-10-CM

## 2017-10-20 DIAGNOSIS — Z87.39: ICD-10-CM

## 2017-10-20 DIAGNOSIS — F17.200: ICD-10-CM

## 2017-10-20 DIAGNOSIS — R11.0: ICD-10-CM

## 2017-10-20 DIAGNOSIS — Z87.09: ICD-10-CM

## 2017-10-20 DIAGNOSIS — I10: ICD-10-CM

## 2017-10-20 DIAGNOSIS — Z87.19: ICD-10-CM

## 2017-10-20 LAB
ALP SERPL-CCNC: 92 U/L (ref 45–117)
ALT SERPL-CCNC: 37 U/L (ref 12–78)
ANION GAP SERPL CALC-SCNC: 7 MEQ/L (ref 5–15)
AST SERPL-CCNC: 25 U/L (ref 15–37)
BASOPHILS # BLD AUTO: 0.1 TH/MM3 (ref 0–0.2)
BASOPHILS NFR BLD AUTO: 1 % (ref 0–2)
BASOPHILS NFR BLD: 0.7 % (ref 0–2)
BILIRUB SERPL-MCNC: 0.8 MG/DL (ref 0.2–1)
BUN SERPL-MCNC: 11 MG/DL (ref 7–18)
CHLORIDE SERPL-SCNC: 103 MEQ/L (ref 98–107)
EOSINOPHIL # BLD: 0.3 TH/MM3 (ref 0–0.4)
EOSINOPHIL NFR BLD: 2 % (ref 0–4)
EOSINOPHIL NFR BLD: 2.3 % (ref 0–4)
ERYTHROCYTE [DISTWIDTH] IN BLOOD BY AUTOMATED COUNT: 11.9 % (ref 11.6–17.2)
GFR SERPLBLD BASED ON 1.73 SQ M-ARVRAT: 68 ML/MIN (ref 89–?)
HCO3 BLD-SCNC: 29.3 MEQ/L (ref 21–32)
HCT VFR BLD CALC: 41.4 % (ref 39–51)
HEMO FLAGS: (no result)
LYMPHOCYTES # BLD AUTO: 8.6 TH/MM3 (ref 1–4.8)
LYMPHOCYTES NFR BLD AUTO: 59.5 % (ref 9–44)
MCH RBC QN AUTO: 31.2 PG (ref 27–34)
MCHC RBC AUTO-ENTMCNC: 33.9 % (ref 32–36)
MCV RBC AUTO: 92 FL (ref 80–100)
MONOCYTES NFR BLD: 7.7 % (ref 0–8)
NEUTROPHILS # BLD AUTO: 4.2 TH/MM3 (ref 1.8–7.7)
NEUTROPHILS NFR BLD AUTO: 29.8 % (ref 16–70)
NEUTS BAND # BLD MANUAL: 4 TH/MM3 (ref 1.8–7.7)
NEUTS SEG NFR BLD MANUAL: 28 % (ref 16–70)
PLAT MORPH BLD: NORMAL
PLATELET # BLD: 318 TH/MM3 (ref 150–450)
PLATELET BLD QL SMEAR: NORMAL
POTASSIUM SERPL-SCNC: 4.5 MEQ/L (ref 3.5–5.1)
RBC # BLD AUTO: 4.5 MIL/MM3 (ref 4.5–5.9)
SCAN/DIFF: (no result)
SODIUM SERPL-SCNC: 139 MEQ/L (ref 136–145)
WBC # BLD AUTO: 14.3 TH/MM3 (ref 4–11)
WBC DIFF SAMPLE: 100

## 2017-10-20 PROCEDURE — 96375 TX/PRO/DX INJ NEW DRUG ADDON: CPT

## 2017-10-20 PROCEDURE — 83690 ASSAY OF LIPASE: CPT

## 2017-10-20 PROCEDURE — 96374 THER/PROPH/DIAG INJ IV PUSH: CPT

## 2017-10-20 PROCEDURE — 96361 HYDRATE IV INFUSION ADD-ON: CPT

## 2017-10-20 PROCEDURE — 85027 COMPLETE CBC AUTOMATED: CPT

## 2017-10-20 PROCEDURE — 85007 BL SMEAR W/DIFF WBC COUNT: CPT

## 2017-10-20 PROCEDURE — 74177 CT ABD & PELVIS W/CONTRAST: CPT

## 2017-10-20 PROCEDURE — 83605 ASSAY OF LACTIC ACID: CPT

## 2017-10-20 PROCEDURE — 80053 COMPREHEN METABOLIC PANEL: CPT

## 2017-10-20 PROCEDURE — 99285 EMERGENCY DEPT VISIT HI MDM: CPT

## 2017-10-20 NOTE — RADRPT
EXAM DATE/TIME:  10/20/2017 20:06 

 

[facility] COMPARISON:     

CT ABDOMEN & PELVIS W CONTRAST, August 06, 2016, 22:37.

 

 

INDICATIONS :     

Abdominal pain for 2 weeks.

                      

 

IV CONTRAST:     

96 cc Omnipaque 350 (iohexol) IV 

 

 

ORAL CONTRAST:      

No oral contrast ingested.

                      

 

RADIATION DOSE:     

10.62 CTDIvol (mGy) 

 

 

MEDICAL HISTORY :     

Hypertension. Chronic obstructive pulmonary disease. Cirrhosis.GERD

 

SURGICAL HISTORY :      

Splenectomy. Appendectomy.Cholecystectomy.WHIPPLE

 

ENCOUNTER:      

Initial

 

ACUITY:      

2 weeks

 

PAIN SCALE:      

7/10

 

LOCATION:       

Left upper quadrant abdomen

 

TECHNIQUE:     

Volumetric scanning of the abdomen and pelvis was performed.  Using automated exposure control and ad
justment of the mA and/or kV according to patient size, radiation dose was kept as low as reasonably 
achievable to obtain optimal diagnostic quality images.  DICOM format image data is available electro
nically for review and comparison.  

 

FINDINGS:     

Bony structures remain intact with clear lung bases. There are vascular calcifications in the aorta a
nd iliac vessels without aneurysm formation. Gallbladder surgically absent with clips in place and li
[place] reveals fatty metamorphosis and splenectomy absent spleen and portions of the head of the pancrea
s remain however there is absence of the body and tail consistent with prior Whipple procedure. Bilat
eral renal cysts are noted the largest of the left kidney. No evidence of renal or ureteral calculi o
r obstructive uropathy with normal adrenal glands. No evidence of significant retroperitoneal adenopa
thy is appreciated bowel distribution is benign.

 

 

CONCLUSION:     

Stable CT scan of the abdomen with evidence of prior cholecystectomy and splenectomy as well as porti
ons of the pancreas absent consistent with history of Whipple procedure.

Stable bilateral renal cysts. 

 

                                   No acute intra-abdominal or pelvic process. 

 

 

 Kayode Bha MD on October 20, 2017 at 20:38           

Board Certified Radiologist.

 This report was verified electronically.

## 2017-10-20 NOTE — PD
HPI


Chief Complaint:  Abdominal Pain


Time Seen by Provider:  18:52


Travel History


International Travel<30 days:  No


Contact w/Intl Traveler<30days:  No


Traveled to known affect area:  No





History of Present Illness


HPI


63 yo M c/o L abdomen pain for 2 weeks, worsening significantly over the past 

week, with symptoms being c/w prior episodes of pancreatic abscesses. pt has hx 

whipple and worries today's symptoms may be similar. no fever. no vomiting. 

appetite normal. + nausea. [facility] severity moderate. timing 

constant.





PFSH


Past Medical History


Hx Anticoagulant Therapy:  No


Arthritis:  Yes


Autoimmune Disease:  Yes (possible LUPUS)


Cancer:  Yes (ABDOMINAL, right hand middle finger REMOVED)


Cardiovascular Problems:  No


Chemotherapy:  No


Cirrhosis:  Yes


COPD:  Yes


Cerebrovascular Accident:  No


Diabetes:  No


Diminished Hearing:  No


GERD:  Yes


Genitourinary:  No


Hepatitis:  No


Hiatal Hernia:  Yes


Hypertension:  Yes


Medical other:  Yes (reflux  arthritis  lupus)


Reproductive:  No


Respiratory:  Yes (COPD)


Immunizations Current:  Yes


Thyroid Disease:  Yes


Tetanus Vaccination:  < 5 Years


Influenza Vaccination:  No


PNEUMOCCOCAL Vaccine (Year):  2011





Past Surgical History


Abdominal Surgery:  Yes (WHIPPLE DID INCLUDE SPLENECTOMY,PART OF PANCREAS,ALSO 

HAD HERNIA REPAIR)


Appendectomy:  Yes


Cholecystectomy:  Yes


Genitourinary Surgery:  Yes (PROSTATE SURGERY)


Neurologic Surgery:  Yes (cervical spine with plates and screws)


Pacemaker:  No


Other Surgery:  Yes (CANCEROUS SKIN LESIONS REMOVED FROM UPPER BACK AND ARMS 

2014)





Social History


Alcohol Use:  No


Tobacco Use:  Yes (1 ppd)


Substance Use:  No





Allergies-Medications


(Allergen,Severity, Reaction):  


Coded Allergies:  


     No Known Allergies (Unverified , 10/20/17)


Reported Meds & Prescriptions





Reported Meds & Active Scripts


Active


Reported


Synthroid (Levothyroxine Sodium) 50 Mcg Tab 50 Mcg PO DAILY








Review of Systems


Except as stated in HPI:  all other systems reviewed are Neg





Physical Exam


Narrative


GENERAL: 63 yo M, WNWD, NAD


SKIN: Warm and dry.


HEAD: Atraumatic. Normocephalic. 


EYES: Pupils equal and round. No scleral icterus. No injection or drainage. 


ENT: No nasal bleeding or discharge.  Mucous membranes pink and moist.


NECK: Trachea midline. No JVD. 


CARDIOVASCULAR: Regular rate and rhythm.  


RESPIRATORY: No accessory muscle use. Clear to auscultation. Breath sounds 

equal bilaterally. 


GASTROINTESTINAL: Soft. No significant TTP L abdomen.


MUSCULOSKELETAL: Extremities without clubbing, cyanosis, or edema. No obvious 

deformities. 


NEUROLOGICAL: Awake and alert. No obvious cranial nerve deficits.  Motor 

grossly within normal limits. Five out of 5 muscle strength in the arms and 

legs.  Normal speech.


PSYCHIATRIC: Appropriate mood and affect; insight and judgment normal.





Data


Data


Last Documented VS





Vital Signs








  Date Time  Temp Pulse Resp B/P (MAP) Pulse Ox O2 Delivery O2 Flow Rate FiO2


 


10/20/17 21:12  77 18 170/92 (118) 97   


 


10/20/17 20:56      Room Air  


 


10/20/17 18:37 98.1       





VS reviewed


Orders





 Orders


Complete Blood Count With Diff (10/20/17 19:08)


Comprehensive Metabolic Panel (10/20/17 19:08)


Lipase (10/20/17 19:08)


Lactic Acid (10/20/17 19:08)


Ct Abd/Pel W Iv Contrast(Rout) (10/20/17 19:08)


Iv Access Insert/Monitor (10/20/17 19:08)


Ecg Monitoring (10/20/17 19:08)


Oximetry (10/20/17 19:08)


Ondansetron Inj (Zofran Inj) (10/20/17 19:15)


Sodium Chlor 0.9% 1000 Ml Inj (Ns 1000 M (10/20/17 19:08)


Sodium Chloride 0.9% Flush (Ns Flush) (10/20/17 19:15)


Morphine Inj (Morphine Inj) (10/20/17 19:15)


Iohexol 350 Inj (Omnipaque 350 Inj) (10/20/17 20:10)


Ed Discharge Order (10/20/17 21:00)





Labs





Laboratory Tests








Test


  10/20/17


19:23


 


White Blood Count 14.3 TH/MM3 


 


Red Blood Count 4.50 MIL/MM3 


 


Hemoglobin 14.0 GM/DL 


 


Hematocrit 41.4 % 


 


Mean Corpuscular Volume 92.0 FL 


 


Mean Corpuscular Hemoglobin 31.2 PG 


 


Mean Corpuscular Hemoglobin


Concent 33.9 % 


 


 


Red Cell Distribution Width 11.9 % 


 


Platelet Count 318 TH/MM3 


 


Mean Platelet Volume 7.9 FL 


 


Neutrophils (%) (Auto) 29.8 % 


 


Lymphocytes (%) (Auto) 59.5 % 


 


Monocytes (%) (Auto) 7.7 % 


 


Eosinophils (%) (Auto) 2.3 % 


 


Basophils (%) (Auto) 0.7 % 


 


Neutrophils # (Auto) 4.2 TH/MM3 


 


Lymphocytes # (Auto) 8.6 TH/MM3 


 


Monocytes # (Auto) 1.1 TH/MM3 


 


Eosinophils # (Auto) 0.3 TH/MM3 


 


Basophils # (Auto) 0.1 TH/MM3 


 


CBC Comment AUTO DIFF 


 


Differential Total Cells


Counted 100 


 


 


Neutrophils % (Manual) 28 % 


 


Lymphocytes % 64 % 


 


Monocytes % 5 % 


 


Eosinophils % 2 % 


 


Basophils % 1 % 


 


Neutrophils # (Manual) 4.0 TH/MM3 


 


Differential Comment


  FINAL DIFF


MANUAL


 


Platelet Estimate NORMAL 


 


Platelet Morphology Comment NORMAL 


 


Blood Urea Nitrogen 11 MG/DL 


 


Creatinine 1.10 MG/DL 


 


Random Glucose 168 MG/DL 


 


Total Protein 8.2 GM/DL 


 


Albumin 3.5 GM/DL 


 


Calcium Level 9.1 MG/DL 


 


Alkaline Phosphatase 92 U/L 


 


Aspartate Amino Transf


(AST/SGOT) 25 U/L 


 


 


Alanine Aminotransferase


(ALT/SGPT) 37 U/L 


 


 


Total Bilirubin 0.8 MG/DL 


 


Sodium Level 139 MEQ/L 


 


Potassium Level 4.5 MEQ/L 


 


Chloride Level 103 MEQ/L 


 


Carbon Dioxide Level 29.3 MEQ/L 


 


Anion Gap 7 MEQ/L 


 


Estimat Glomerular Filtration


Rate 68 ML/MIN 


 


 


Lactic Acid Level 1.5 mmol/L 


 


Lipase 104 U/L 











[facility]


Medical Decision Making


Medical Screen Exam Complete:  Yes


Emergency Medical Condition:  Yes


Medical Record Reviewed:  Yes


Differential Diagnosis


Constipation, Gastritis, Acute Cholecystitis, Biliary Colic, Pancreatitis, GONZALES

, Hepatitis, Bowel Obstruction, Cystitis, Mesenteric Ischemia, AAA, Appendicitis

, Renal Stone/Hydronephrosis, GERD, perforated viscous


Narrative Course


CBC & BMP Diagram


10/20/17 19:23








Total Protein 8.2, Albumin 3.5, Calcium Level 9.1, Alkaline Phosphatase 92, 

Aspartate Amino Transf (AST/SGOT) 25, Alanine Aminotransferase (ALT/SGPT) 37, 

Total Bilirubin 0.8


Lactic acid 1.5


Lipase 104


The patient is resting comfortably and feels better, is alert and in no 

distress. The patients results and examination findings were discussed.  The 

repeat examination is unremarkable and benign. The history, exam, diagnostic 

testing, and current condition do not suggest any significant pathology to 

warrant further testing, continued ED treatment, admission, or surgical 

evaluation at this point. The vital signs have been stable. The patient does 

not have uncontrollable pain, intractable vomiting, or other significant 

symptoms. The patient's condition is stable and appropriate for discharge. The 

patient will pursue further outpatient evaluation with a primary care physician 

or other designated or consulting physician as indicated in the discharge 

instructions. The patient expressed understanding and was agreeable with this 

plan.





Diagnosis





 Primary Impression:  


 Abdominal pain


 Qualified Codes:  R10.12 - Left upper quadrant pain


[facility]  


[facility]


call for appointment





***Additional Instructions:  


You have a choice when it comes to health care, and we are glad that you chose 

[facility]. Hopefully, we have met your expectations on today's visit.  You 

are welcome to return to [facility] at any time, as we are committed to 

meeting the health care needs of our community.


***Med/Other Pt SpecificInfo:  No Change to Meds


Disposition:  01 DISCHARGE HOME


Condition:  Stable











Augie Hernández MD Oct 20, 2017 21:00

## 2018-01-11 ENCOUNTER — HOSPITAL ENCOUNTER (EMERGENCY)
Dept: HOSPITAL 17 - NEPE | Age: 63
Discharge: HOME | End: 2018-01-11
Payer: OTHER GOVERNMENT

## 2018-01-11 VITALS
HEART RATE: 108 BPM | RESPIRATION RATE: 20 BRPM | SYSTOLIC BLOOD PRESSURE: 186 MMHG | OXYGEN SATURATION: 98 % | DIASTOLIC BLOOD PRESSURE: 87 MMHG | TEMPERATURE: 99.1 F

## 2018-01-11 VITALS — WEIGHT: 175.27 LBS | HEIGHT: 69 IN | BODY MASS INDEX: 25.96 KG/M2

## 2018-01-11 DIAGNOSIS — K74.60: ICD-10-CM

## 2018-01-11 DIAGNOSIS — R09.1: ICD-10-CM

## 2018-01-11 DIAGNOSIS — R10.9: Primary | ICD-10-CM

## 2018-01-11 DIAGNOSIS — J44.9: ICD-10-CM

## 2018-01-11 DIAGNOSIS — Z72.0: ICD-10-CM

## 2018-01-11 DIAGNOSIS — I10: ICD-10-CM

## 2018-01-11 LAB
ALBUMIN SERPL-MCNC: 3.7 GM/DL (ref 3.4–5)
ALP SERPL-CCNC: 104 U/L (ref 45–117)
ALT SERPL-CCNC: 36 U/L (ref 12–78)
AST SERPL-CCNC: 24 U/L (ref 15–37)
BASOPHILS # BLD AUTO: 0.1 TH/MM3 (ref 0–0.2)
BASOPHILS NFR BLD AUTO: 1 % (ref 0–2)
BASOPHILS NFR BLD: 0.9 % (ref 0–2)
BILIRUB SERPL-MCNC: 0.6 MG/DL (ref 0.2–1)
BUN SERPL-MCNC: 11 MG/DL (ref 7–18)
CALCIUM SERPL-MCNC: 8.9 MG/DL (ref 8.5–10.1)
CHLORIDE SERPL-SCNC: 101 MEQ/L (ref 98–107)
COLOR UR: YELLOW
CREAT SERPL-MCNC: 1.25 MG/DL (ref 0.6–1.3)
EOSINOPHIL # BLD: 0.3 TH/MM3 (ref 0–0.4)
EOSINOPHIL NFR BLD: 1.7 % (ref 0–4)
ERYTHROCYTE [DISTWIDTH] IN BLOOD BY AUTOMATED COUNT: 13 % (ref 11.6–17.2)
GFR SERPLBLD BASED ON 1.73 SQ M-ARVRAT: 59 ML/MIN (ref 89–?)
GLUCOSE SERPL-MCNC: 165 MG/DL (ref 74–106)
GLUCOSE UR STRIP-MCNC: (no result) MG/DL
HCO3 BLD-SCNC: 29.4 MEQ/L (ref 21–32)
HCT VFR BLD CALC: 42.5 % (ref 39–51)
HGB BLD-MCNC: 15.1 GM/DL (ref 13–17)
HGB UR QL STRIP: (no result)
INR PPP: 1 RATIO
KETONES UR STRIP-MCNC: (no result) MG/DL
LYMPHOCYTES # BLD AUTO: 9.4 TH/MM3 (ref 1–4.8)
LYMPHOCYTES NFR BLD AUTO: 61 % (ref 9–44)
LYMPHOCYTES: 62 % (ref 9–44)
MCH RBC QN AUTO: 32.8 PG (ref 27–34)
MCHC RBC AUTO-ENTMCNC: 35.6 % (ref 32–36)
MCV RBC AUTO: 92.1 FL (ref 80–100)
MONOCYTE #: 1.2 TH/MM3 (ref 0–0.9)
MONOCYTES NFR BLD: 8 % (ref 0–8)
MONOCYTES: 12 % (ref 0–8)
MUCOUS THREADS #/AREA URNS LPF: (no result) /LPF
NEUTROPHILS # BLD AUTO: 4.4 TH/MM3 (ref 1.8–7.7)
NEUTROPHILS NFR BLD AUTO: 28.4 % (ref 16–70)
NEUTS BAND # BLD MANUAL: 3.4 TH/MM3 (ref 1.8–7.7)
NEUTS BAND NFR BLD: 1 % (ref 0–6)
NEUTS SEG NFR BLD MANUAL: 21 % (ref 16–70)
NITRITE UR QL STRIP: (no result)
PLATELET # BLD: 341 TH/MM3 (ref 150–450)
PMV BLD AUTO: 8.4 FL (ref 7–11)
PROT SERPL-MCNC: 8.3 GM/DL (ref 6.4–8.2)
PROTHROMBIN TIME: 10.5 SEC (ref 9.8–11.6)
RBC # BLD AUTO: 4.62 MIL/MM3 (ref 4.5–5.9)
SODIUM SERPL-SCNC: 137 MEQ/L (ref 136–145)
SP GR UR STRIP: 1.02 (ref 1–1.03)
SPERM, URINE: (no result)
SQUAMOUS #/AREA URNS HPF: <1 /HPF (ref 0–5)
URINE LEUKOCYTE ESTERASE: (no result)
WBC # BLD AUTO: 15.4 TH/MM3 (ref 4–11)

## 2018-01-11 PROCEDURE — 85730 THROMBOPLASTIN TIME PARTIAL: CPT

## 2018-01-11 PROCEDURE — 81001 URINALYSIS AUTO W/SCOPE: CPT

## 2018-01-11 PROCEDURE — 99285 EMERGENCY DEPT VISIT HI MDM: CPT

## 2018-01-11 PROCEDURE — 85610 PROTHROMBIN TIME: CPT

## 2018-01-11 PROCEDURE — 71045 X-RAY EXAM CHEST 1 VIEW: CPT

## 2018-01-11 PROCEDURE — 80053 COMPREHEN METABOLIC PANEL: CPT

## 2018-01-11 PROCEDURE — 85027 COMPLETE CBC AUTOMATED: CPT

## 2018-01-11 PROCEDURE — 85007 BL SMEAR W/DIFF WBC COUNT: CPT

## 2018-01-11 PROCEDURE — 74176 CT ABD & PELVIS W/O CONTRAST: CPT

## 2018-01-11 NOTE — RADRPT
EXAM DATE/TIME:  01/11/2018 19:40 

 

[facility] COMPARISON:     

CT ABDOMEN & PELVIS W/O CONTRAST, January 11, 2018, 20:04.  CHEST SINGLE AP, April 24, 2017, 13:57.

 

                     

INDICATIONS :     

Chest pain and dyspnea.

                     

 

MEDICAL HISTORY :     

Chronic obstructive pulmonary disease.          

 

SURGICAL HISTORY :     

None.   

 

ENCOUNTER:     

Initial                                        

 

ACUITY:     

1 day      

 

PAIN SCORE:     

8/10

 

LOCATION:     

Bilateral lower chest 

 

FINDINGS:     

A single view of the chest demonstrates the lungs to be symmetrically aerated without evidence of mas
s, infiltrate or effusion.  The cardiomediastinal contours are unremarkable.  Osseous structures are 
intact.

 

CONCLUSION:     

No evidence of acute cardiopulmonary disease.

 

 

 

 Faraz Xie MD on January 11, 2018 at 22:31           

Board Certified Radiologist.

 This report was verified electronically.

## 2018-01-11 NOTE — PD
HPI


.


Flank/kidney pain


Chief Complaint:  Flank/Kidney Pain


Time Seen by Provider:  19:26


Travel History


International Travel<30 days:  No


Contact w/Intl Traveler<30days:  No


Traveled to known affect area:  No





History of Present Illness


HPI


62-year-old male complains of having left flank pain for the past 2 days, worse 

with deep breath and movement.  Patient denies any cough fever chills sweats, 

dysuria urgency frequency, or hematuria.  Patient has no recent confining 

travel or sedentary period, denies having leg pain or leg swelling.  Patient 

has had a recent upper respiratory infection.





Formerly [facility], later [facility]


Past Medical History


*** Narrative Medical


Past medical history reviewed


Hx Anticoagulant Therapy:  No


Arthritis:  Yes


Autoimmune Disease:  Yes (possible LUPUS)


Cancer:  Yes (ABDOMINAL, right hand middle finger REMOVED)


Cardiovascular Problems:  No


Chemotherapy:  No


Cirrhosis:  Yes


COPD:  Yes


Cerebrovascular Accident:  No


Diabetes:  No


Diminished Hearing:  No


GERD:  Yes


Genitourinary:  No


Hepatitis:  No


Hiatal Hernia:  Yes


Hypertension:  Yes


Inguinal Hernia:  Yes (repaired)


Medical other:  Yes (reflux  arthritis  lupus)


Reproductive:  No


Respiratory:  Yes (COPD)


Immunizations Current:  Yes


Thyroid Disease:  Yes


PNEUMOCCOCAL Vaccine (Year):  2011





Past Surgical History


Abdominal Surgery:  Yes (WHIPPLE DID INCLUDE SPLENECTOMY,PART OF PANCREAS,ALSO 

HAD HERNIA REPAIR)


Appendectomy:  Yes


Cholecystectomy:  Yes


Genitourinary Surgery:  Yes (PROSTATE SURGERY)


Neurologic Surgery:  Yes (cervical spine with plates and screws)


Pacemaker:  No


Other Surgery:  Yes (CANCEROUS SKIN LESIONS REMOVED FROM UPPER BACK AND ARMS 

2014)





Social History


Alcohol Use:  No


Tobacco Use:  Yes (1 ppd)


Substance Use:  No





Allergies-Medications


(Allergen,Severity, Reaction):  


Coded Allergies:  


     No Known Allergies (Unverified , 10/20/17)


Reported Meds & Prescriptions





Reported Meds & Active Scripts


Active


Reported


Aspirin 81 (Aspirin) 81 Mg Tabdr 81 Mg PO DAILY


Albuterol Neb (Albuterol Sulfate) 2.5 Mg/3 Ml Neb 2.5 Mg NEB Q4HR NEB PRN


Nebulizer 1 Mis Mis Ea .ROUTE AS DIRECTED


Synthroid (Levothyroxine Sodium) 50 Mcg Tab 50 Mcg PO DAILY





Narrative Medication


Allergies medications reviewed





Review of Systems


Except as stated in HPI:  all other systems reviewed are Neg


General / Constitutional:  No: Fever


Eyes:  No: Visual changes


HENT:  No: Headaches


Cardiovascular:  No: Chest Pain or Discomfort


Respiratory:  No: Shortness of Breath


Gastrointestinal:  No: Abdominal Pain


Genitourinary:  Positive: Flank Pain, No: Dysuria


Musculoskeletal:  No: Pain


Skin:  No Rash


Neurologic:  No: Weakness


Psychiatric:  No: Depression


Endocrine:  No: Polydipsia


Hematologic/Lymphatic:  No: Easy Bruising





Physical Exam


Narrative


GENERAL: Awake alert oriented 3 no acute distress


SKIN: Warm and dry.  Color is normal no diaphoresis cyanosis or pallor


HEAD: Atraumatic. Normocephalic. 


EYES: Pupils equal and round. No scleral icterus. No injection or drainage. 


ENT: No nasal bleeding or discharge.  Mucous membranes pink and moist.


NECK: Trachea midline. No JVD.  Supple full range of motion


CARDIOVASCULAR: Regular rate and rhythm.  S1-S2 no murmurs or gallops


RESPIRATORY: No accessory muscle use. Clear to auscultation. Breath sounds 

equal bilaterally. 


GASTROINTESTINAL: Abdomen soft, non-tender, nondistended. Hepatic and splenic 

margins not palpable.  Mild CVA tenderness left


MUSCULOSKELETAL: Extremities without clubbing, cyanosis, or edema. No obvious 

deformities. 


NEUROLOGICAL: Awake and alert. No obvious cranial nerve deficits.  Motor 

grossly within normal limits. Five out of 5 muscle strength in the arms and 

legs.  Normal speech.


PSYCHIATRIC: Appropriate mood and affect; insight and judgment normal.





Data


Data


Last Documented VS





Vital Signs








  Date Time  Temp Pulse Resp B/P (MAP) Pulse Ox O2 Delivery O2 Flow Rate FiO2


 


1/11/18 16:36 99.1 108 20 186/87 (120) 98 Room Air  








Orders





 Orders


Complete Blood Count With Diff (1/11/18 16:47)


Comprehensive Metabolic Panel (1/11/18 16:47)


Urinalysis - C+S If Indicated (1/11/18 16:47)


Act Partial Throm Time (Ptt) (1/11/18 16:47)


Prothrombin Time / Inr (Pt) (1/11/18 16:47)


Chest, Single Ap (1/11/18 )


Ct Abd/Pel W/O Iv Contrast (1/11/18 )


Ketorolac Inj (Toradol Inj) (1/11/18 21:15)


Tramadol (Ultram) (1/11/18 21:15)


Azithromycin (Zithromax) (1/11/18 21:15)





Labs





Laboratory Tests








Test


  1/11/18


16:56 1/11/18


16:58


 


White Blood Count 15.4 TH/MM3  


 


Red Blood Count 4.62 MIL/MM3  


 


Hemoglobin 15.1 GM/DL  


 


Hematocrit 42.5 %  


 


Mean Corpuscular Volume 92.1 FL  


 


Mean Corpuscular Hemoglobin 32.8 PG  


 


Mean Corpuscular Hemoglobin


Concent 35.6 % 


  


 


 


Red Cell Distribution Width 13.0 %  


 


Platelet Count 341 TH/MM3  


 


Mean Platelet Volume 8.4 FL  


 


Neutrophils (%) (Auto) 28.4 %  


 


Lymphocytes (%) (Auto) 61.0 %  


 


Monocytes (%) (Auto) 8.0 %  


 


Eosinophils (%) (Auto) 1.7 %  


 


Basophils (%) (Auto) 0.9 %  


 


Neutrophils # (Auto) 4.4 TH/MM3  


 


Lymphocytes # (Auto) 9.4 TH/MM3  


 


Monocytes # (Auto) 1.2 TH/MM3  


 


Eosinophils # (Auto) 0.3 TH/MM3  


 


Basophils # (Auto) 0.1 TH/MM3  


 


CBC Comment AUTO DIFF  


 


Differential Total Cells


Counted 100 


  


 


 


Neutrophils % (Manual) 21 %  


 


Band Neutrophils % 1 %  


 


Lymphocytes % 62 %  


 


Monocytes % 12 %  


 


Eosinophils % 3 %  


 


Basophils % 1 %  


 


Neutrophils # (Manual) 3.4 TH/MM3  


 


Differential Comment


  FINAL DIFF


MANUAL 


 


 


Platelet Morphology Comment NORMAL  


 


Prothrombin Time 10.5 SEC  


 


Prothromb Time International


Ratio 1.0 RATIO 


  


 


 


Activated Partial


Thromboplast Time 26.1 SEC 


  


 


 


Blood Urea Nitrogen 11 MG/DL  


 


Creatinine 1.25 MG/DL  


 


Random Glucose 165 MG/DL  


 


Total Protein 8.3 GM/DL  


 


Albumin 3.7 GM/DL  


 


Calcium Level 8.9 MG/DL  


 


Alkaline Phosphatase 104 U/L  


 


Aspartate Amino Transf


(AST/SGOT) 24 U/L 


  


 


 


Alanine Aminotransferase


(ALT/SGPT) 36 U/L 


  


 


 


Total Bilirubin 0.6 MG/DL  


 


Sodium Level 137 MEQ/L  


 


Potassium Level 4.4 MEQ/L  


 


Chloride Level 101 MEQ/L  


 


Carbon Dioxide Level 29.4 MEQ/L  


 


Anion Gap 7 MEQ/L  


 


Estimat Glomerular Filtration


Rate 59 ML/MIN 


  


 


 


Urine Color  YELLOW 


 


Urine Turbidity  CLEAR 


 


Urine pH  5.5 


 


Urine Specific Gravity  1.024 


 


Urine Protein  TRACE mg/dL 


 


Urine Glucose (UA)  NEG mg/dL 


 


Urine Ketones  NEG mg/dL 


 


Urine Occult Blood  NEG 


 


Urine Nitrite  NEG 


 


Urine Bilirubin  NEG 


 


Urine Urobilinogen  2.0 MG/DL 


 


Urine Leukocyte Esterase  NEG 


 


Urine RBC  1 /hpf 


 


Urine WBC  1 /hpf 


 


Urine Squamous Epithelial


Cells 


  <1 /hpf 


 


 


Urine Mucus  FEW /lpf 


 


Urine Sperm  RARE 


 


Microscopic Urinalysis Comment


  


  CULT NOT


INDICATED











MDM


Medical Decision Making


Medical Screen Exam Complete:  Yes


Emergency Medical Condition:  Yes


Medical Record Reviewed:  Yes


Differential Diagnosis


Pneumonia, pleuritis, pyelonephritis, renal colic, obstructive uropathy


Narrative Course


Patient's laboratory examinations reviewed, patient has elevated white blood 

cell count 15.7.  CT abdomen and pelvis noncontrast reviewed, lung fields are 

clear the inferior aspects, patient has no acute intra-abdominal pathology no 

obstructive uropathy.





Diagnosis





 Primary Impression:  


 Acute flank pain


 Additional Impression:  


 Pleurisy


Patient Instructions:  Flank Pain (ED), General Instructions, Pleurisy (ED)





***Additional Instructions:  


Ultram 50 mg every 8 hours as needed for pain.  Zithromax 250 mg daily for 4 

more days.  Follow-up with your doctor.  Return for worsening


Scripts


Azithromycin (Zithromax) 250 Mg Tab


250 MG PO DAILY for Infection, #4 TAB 0 Refills


   Prov: Darrell Baez MD         1/11/18 


Tramadol (Ultram) 50 Mg Tab


50 MG PO Q8H Y for PAIN, #15 TAB 0 Refills


   Prov: Darrell Baez MD         1/11/18


Disposition:  01 DISCHARGE HOME


Condition:  Stable











Darrell Baez MD Jan 11, 2018 21:27

## 2018-01-11 NOTE — RADRPT
EXAM DATE/TIME:  01/11/2018 20:04 

 

[facility] COMPARISON:     

CT ABDOMEN & PELVIS W CONTRAST, October 20, 2017, 20:06.

 

 

INDICATIONS :     

Bilateral flank pain. 

                  

 

ORAL CONTRAST:      

No oral contrast ingested.

                  

 

RADIATION DOSE:     

13.61 CTDIvol (mGy) 

 

 

MEDICAL HISTORY :     

Hernia, hiatal. Hypertension. Cirrhosis.

 

SURGICAL HISTORY :      

Appendectomy. Cholecystectomy.Splenectomy.

 

ENCOUNTER:      

Initial

 

ACUITY:      

3 days

 

PAIN SCALE:      

9/10

 

LOCATION:       

Bilateral flank 

 

TECHNIQUE:     

Volumetric scanning of the abdomen and pelvis was performed.  Using automated exposure control and ad
justment of the mA and/or kV according to patient size, radiation dose was kept as low as reasonably 
achievable to obtain optimal diagnostic quality images.  DICOM format image data is available electro
nically for review and comparison.  

 

FINDINGS:     

 

LOWER LUNGS:     

The visualized lower lungs are clear.

 

LIVER:     

Liver is enlarged and fatty infiltrated. Previous cholecystectomy.

 

SPLEEN:     

Previous splenectomy. An approximately 1 cm splenule is seen in the left upper quadrant.

 

PANCREAS:     

Pancreatic tail resected. The head and body remnant are without an acute abnormality.

 

KIDNEYS:     

Large left mid zone cyst measuring 7.4 cm again noted. There are a few small scattered cysts on both 
sides. No stones, hydronephrosis or hydroureter.

 

ADRENAL GLANDS:     

Within normal limits.

 

VASCULAR:     

There is atherosclerosis of the abdominal aorta and iliac arteries. No aneurysm.

 

BOWEL/MESENTERY:     

The stomach, small bowel, and colon demonstrate no acute abnormality.  There is no free intraperitone
al air or fluid. 

 

ABDOMINAL WALL:     

Within normal limits.

 

RETROPERITONEUM:     

There is no lymphadenopathy.

 

BLADDER:     

No wall thickening or mass.

 

REPRODUCTIVE:     

Within normal limits.

 

INGUINAL:     

There is no lymphadenopathy or hernia.

 

MUSCULOSKELETAL:     

Within normal limits for patient age.

 

CONCLUSION:     

1. Liver is enlarged and fatty infiltrated.

2. Otherwise essentially negative. No renal or ureteral stones or evidence of obstructive uropathy. B
ilateral appearing renal cysts are stable.

3. Cholecystectomy, distal pancreatectomy and splenectomy changes are again noted.

 

 

 

 Faraz Xie MD on January 11, 2018 at 20:42           

Board Certified Radiologist.

 This report was verified electronically.

## 2018-06-18 ENCOUNTER — HOSPITAL ENCOUNTER (OUTPATIENT)
Dept: HOSPITAL 17 - PHED | Age: 63
Setting detail: OBSERVATION
LOS: 1 days | Discharge: HOME | End: 2018-06-19
Attending: HOSPITALIST | Admitting: HOSPITALIST
Payer: OTHER GOVERNMENT

## 2018-06-18 VITALS — HEIGHT: 69 IN | BODY MASS INDEX: 27.98 KG/M2 | WEIGHT: 188.94 LBS

## 2018-06-18 VITALS
OXYGEN SATURATION: 96 % | SYSTOLIC BLOOD PRESSURE: 195 MMHG | RESPIRATION RATE: 18 BRPM | HEART RATE: 84 BPM | DIASTOLIC BLOOD PRESSURE: 95 MMHG

## 2018-06-18 VITALS — OXYGEN SATURATION: 96 % | RESPIRATION RATE: 18 BRPM

## 2018-06-18 VITALS
OXYGEN SATURATION: 97 % | HEART RATE: 79 BPM | RESPIRATION RATE: 18 BRPM | SYSTOLIC BLOOD PRESSURE: 131 MMHG | DIASTOLIC BLOOD PRESSURE: 83 MMHG

## 2018-06-18 VITALS
SYSTOLIC BLOOD PRESSURE: 114 MMHG | RESPIRATION RATE: 18 BRPM | OXYGEN SATURATION: 97 % | HEART RATE: 68 BPM | DIASTOLIC BLOOD PRESSURE: 65 MMHG

## 2018-06-18 DIAGNOSIS — K21.9: ICD-10-CM

## 2018-06-18 DIAGNOSIS — R94.31: ICD-10-CM

## 2018-06-18 DIAGNOSIS — Z90.81: ICD-10-CM

## 2018-06-18 DIAGNOSIS — F17.210: ICD-10-CM

## 2018-06-18 DIAGNOSIS — K44.9: ICD-10-CM

## 2018-06-18 DIAGNOSIS — J44.9: ICD-10-CM

## 2018-06-18 DIAGNOSIS — M19.90: ICD-10-CM

## 2018-06-18 DIAGNOSIS — E78.5: ICD-10-CM

## 2018-06-18 DIAGNOSIS — R07.9: Primary | ICD-10-CM

## 2018-06-18 DIAGNOSIS — E03.9: ICD-10-CM

## 2018-06-18 DIAGNOSIS — D72.829: ICD-10-CM

## 2018-06-18 DIAGNOSIS — I10: ICD-10-CM

## 2018-06-18 DIAGNOSIS — R42: ICD-10-CM

## 2018-06-18 DIAGNOSIS — I45.9: ICD-10-CM

## 2018-06-18 DIAGNOSIS — R11.0: ICD-10-CM

## 2018-06-18 DIAGNOSIS — K74.60: ICD-10-CM

## 2018-06-18 DIAGNOSIS — Z82.49: ICD-10-CM

## 2018-06-18 LAB
ALBUMIN SERPL-MCNC: 3.6 GM/DL (ref 3.4–5)
ALP SERPL-CCNC: 110 U/L (ref 45–117)
ALT SERPL-CCNC: 47 U/L (ref 12–78)
AST SERPL-CCNC: 34 U/L (ref 15–37)
BASOPHILS # BLD AUTO: 0.1 TH/MM3 (ref 0–0.2)
BASOPHILS NFR BLD: 0.6 % (ref 0–2)
BILIRUB INDIRECT SERPL-MCNC: 0.6 MG/DL (ref 0–0.8)
BILIRUB SERPL-MCNC: 0.8 MG/DL (ref 0.2–1)
BUN SERPL-MCNC: 11 MG/DL (ref 7–18)
CALCIUM SERPL-MCNC: 8.8 MG/DL (ref 8.5–10.1)
CHLORIDE SERPL-SCNC: 105 MEQ/L (ref 98–107)
CREAT SERPL-MCNC: 1.1 MG/DL (ref 0.6–1.3)
D-DIMER: 1.27 MG/L FEU (ref 0–0.5)
DIRECT BILIRUBIN ADULT: 0.2 MG/DL (ref 0–0.2)
EOSINOPHIL # BLD: 0.2 TH/MM3 (ref 0–0.4)
EOSINOPHIL NFR BLD: 1.7 % (ref 0–4)
ERYTHROCYTE [DISTWIDTH] IN BLOOD BY AUTOMATED COUNT: 12.1 % (ref 11.6–17.2)
GFR SERPLBLD BASED ON 1.73 SQ M-ARVRAT: 68 ML/MIN (ref 89–?)
GLUCOSE SERPL-MCNC: 100 MG/DL (ref 74–106)
HCO3 BLD-SCNC: 27.8 MEQ/L (ref 21–32)
HCT VFR BLD CALC: 43.7 % (ref 39–51)
HGB BLD-MCNC: 14.9 GM/DL (ref 13–17)
INR PPP: 1 RATIO
LYMPHOCYTES # BLD AUTO: 9.4 TH/MM3 (ref 1–4.8)
LYMPHOCYTES NFR BLD AUTO: 65.4 % (ref 9–44)
LYMPHOCYTES: 66 % (ref 9–44)
MAGNESIUM SERPL-MCNC: 2 MG/DL (ref 1.5–2.5)
MCH RBC QN AUTO: 32.4 PG (ref 27–34)
MCHC RBC AUTO-ENTMCNC: 34.2 % (ref 32–36)
MCV RBC AUTO: 94.8 FL (ref 80–100)
MONOCYTE #: 1.2 TH/MM3 (ref 0–0.9)
MONOCYTES NFR BLD: 8 % (ref 0–8)
MONOCYTES: 7 % (ref 0–8)
NEUTROPHILS # BLD AUTO: 3.5 TH/MM3 (ref 1.8–7.7)
NEUTROPHILS NFR BLD AUTO: 24.3 % (ref 16–70)
NEUTS BAND # BLD MANUAL: 3.7 TH/MM3 (ref 1.8–7.7)
NEUTS SEG NFR BLD MANUAL: 26 % (ref 16–70)
PLATELET # BLD: 279 TH/MM3 (ref 150–450)
PMV BLD AUTO: 8.4 FL (ref 7–11)
PROT SERPL-MCNC: 8.2 GM/DL (ref 6.4–8.2)
PROTHROMBIN TIME: 10.5 SEC (ref 9.8–11.6)
RBC # BLD AUTO: 4.61 MIL/MM3 (ref 4.5–5.9)
SODIUM SERPL-SCNC: 138 MEQ/L (ref 136–145)
TROPONIN I SERPL-MCNC: (no result) NG/ML (ref 0.02–0.05)
WBC # BLD AUTO: 14.4 TH/MM3 (ref 4–11)

## 2018-06-18 PROCEDURE — 93017 CV STRESS TEST TRACING ONLY: CPT

## 2018-06-18 PROCEDURE — 78452 HT MUSCLE IMAGE SPECT MULT: CPT

## 2018-06-18 PROCEDURE — 96376 TX/PRO/DX INJ SAME DRUG ADON: CPT

## 2018-06-18 PROCEDURE — 83735 ASSAY OF MAGNESIUM: CPT

## 2018-06-18 PROCEDURE — G0378 HOSPITAL OBSERVATION PER HR: HCPCS

## 2018-06-18 PROCEDURE — 84484 ASSAY OF TROPONIN QUANT: CPT

## 2018-06-18 PROCEDURE — 71046 X-RAY EXAM CHEST 2 VIEWS: CPT

## 2018-06-18 PROCEDURE — 93005 ELECTROCARDIOGRAM TRACING: CPT

## 2018-06-18 PROCEDURE — A9502 TC99M TETROFOSMIN: HCPCS

## 2018-06-18 PROCEDURE — 96375 TX/PRO/DX INJ NEW DRUG ADDON: CPT

## 2018-06-18 PROCEDURE — 85007 BL SMEAR W/DIFF WBC COUNT: CPT

## 2018-06-18 PROCEDURE — 96372 THER/PROPH/DIAG INJ SC/IM: CPT

## 2018-06-18 PROCEDURE — 80048 BASIC METABOLIC PNL TOTAL CA: CPT

## 2018-06-18 PROCEDURE — 82550 ASSAY OF CK (CPK): CPT

## 2018-06-18 PROCEDURE — 82552 ASSAY OF CPK IN BLOOD: CPT

## 2018-06-18 PROCEDURE — 96361 HYDRATE IV INFUSION ADD-ON: CPT

## 2018-06-18 PROCEDURE — 80076 HEPATIC FUNCTION PANEL: CPT

## 2018-06-18 PROCEDURE — 71275 CT ANGIOGRAPHY CHEST: CPT

## 2018-06-18 PROCEDURE — 85730 THROMBOPLASTIN TIME PARTIAL: CPT

## 2018-06-18 PROCEDURE — 85027 COMPLETE CBC AUTOMATED: CPT

## 2018-06-18 PROCEDURE — 99291 CRITICAL CARE FIRST HOUR: CPT

## 2018-06-18 PROCEDURE — 85379 FIBRIN DEGRADATION QUANT: CPT

## 2018-06-18 PROCEDURE — 85610 PROTHROMBIN TIME: CPT

## 2018-06-18 PROCEDURE — 85025 COMPLETE CBC W/AUTO DIFF WBC: CPT

## 2018-06-18 PROCEDURE — 96374 THER/PROPH/DIAG INJ IV PUSH: CPT

## 2018-06-18 RX ADMIN — NITROGLYCERIN SCH MG: 0.4 TABLET SUBLINGUAL at 20:34

## 2018-06-18 RX ADMIN — NITROGLYCERIN SCH MG: 0.4 TABLET SUBLINGUAL at 20:42

## 2018-06-18 RX ADMIN — NITROGLYCERIN SCH MG: 0.4 TABLET SUBLINGUAL at 20:46

## 2018-06-18 NOTE — RADRPT
EXAM DATE:  6/18/2018 8:38 PM EDT

AGE/SEX:        62 years / Male



INDICATIONS:  Intermittent chest pain.



CLINICAL DATA:  This is the patient's initial encounter. Patient reports that signs and symptoms have
 been present for 2 weeks and indicates a pain score of 4/10. 

                                                                          

MEDICAL/SURGICAL HISTORY:       Chronic obstructive pulmonary disease. Hernia, hiatal. Hypertension. 
Cirrhosis.  . Appendectomy. Cholecystectomy. Splenectomy.



COMPARISON:      [facility] [place], CHEST PA & LAT, 11/6/2013.  . 





FINDINGS:  

PA and lateral views of the chest demonstrate the lungs to be symmetrically aerated without evidence 
of mass, infiltrate or effusion. The cardiomediastinal contours are unremarkable. Osseous structures 
are intact.



CONCLUSION: 

No focal consolidation or effusion. No pneumothorax.



Electronically signed by: Spencer Jaeger MD  6/18/2018 8:41 PM EDT

## 2018-06-18 NOTE — RADRPT
EXAM DATE:  6/18/2018 11:32 PM EDT

AGE/SEX:        62 years / Male



INDICATIONS:  Chest pain; rule out pulmonary embolus.



CLINICAL DATA:  This is the patient's initial encounter. Patient reports that signs and symptoms have
 been present for 1 day and indicates a pain score of 7/10. 

                                                                          

MEDICAL/SURGICAL HISTORY:   Gastroesophageal reflux disease.  Chronic obstructive pulmonary disease. 
 Hypertension.  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  Sple
nectomy. [facility]



RADIATION DOSE:  15.61 CTDI (mGy)









COMPARISON:      [facility] [place], CT PULMONARY ANGIOGRAM, 10/29/2016.  . 





TECHNIQUE:  Volumetric scanning was performed using a multi-row detector CT scanner during bolus infu
jacob of 74 ml Omnipaque 350 (iohexol)  nonionic water-soluble contrast as a single exam dose. The aracelis
a was post processed with a variety of visualization algorithms including full volume maximum intensi
ty projection and sliding thin slab reformation. Using automated exposure control and adjustment of t
he mA and/or kV according to patient size, radiation dose was kept as low as reasonably achievable to
 obtain optimal diagnostic quality images.  DICOM format image data is available electronically for r
eview and comparison.  



FINDINGS:  

Pulmonary Arteries:  No filling defects are seen in the pulmonary arteries out to the subsegmental ve
ssels.  The left and right pulmonary arteries are normal in diameter.

Lung:  No infiltrates seen. Diffuse emphysema.

Effusion:  None.

Mediastinum:  No evidence of mediastinal or hilar adenopathy.

Other:  The axilla is unremarkable.



CONCLUSION:

1.  This study is negative for pulmonary embolism. Diffuse emphysema



Electronically signed by: Marciano Arguello MD  6/18/2018 11:47 PM EDT

## 2018-06-18 NOTE — PD
HPI


Chief Complaint:  Chest Pain


Time Seen by Provider:  20:22


Travel History


International Travel<30 days:  No


Contact w/Intl Traveler<30days:  No


Traveled to known affect area:  No





History of Present Illness


HPI


62-year-old male presents to the emergency department by private transportation 

for complaint of 4/10 retrosternal chest pain with dizziness.  Patient has had 

episodes intermittently for the past 2 weeks.  Patient states symptoms worse 

today.  Patient states he is also experienced some mild nausea without 

vomiting.  No shortness of breath or sweats.  No new upper extremity or lower 

extremity numbness tingling or weakness or new balance disturbance.  Patient 

uses cane at all times.  Patient states that [facility] and at [facility] an [facility] was performed and found to be abnormal therefore 

he was scheduled at that time for a stress test to be performed July 12 which 

has not yet occurred.  Patient also has known carotid vessel disease and is 

being followed annually with carotid studies as a monitoring modality and 

states he has had no new numbness tingling or weakness but has been having 

episodes of dizziness.  Patient is scheduled to have carotid study June 25.  

Patient states no new headache no new change in mentation no new visual 

disturbance no new balance disturbance no new upper or lower extremity numbness 

tingling or weakness or ataxia gait.  Patient states that he does not have 

history of TIA CVA hypertension dyslipidemia or diabetes.  Patient takes only 

medication for hypothyroidism and no other prescription medications.  Patient 

does admit to pack per day tobacco use for the past 40+ years.  Patient has 

family history of heart disease with father passing away from MI at time of 

three-vessel CABG at age 70.  Patient is unable to identify exacerbating or 

alleviating factors.  Patient's had no recent febrile illness and no recent 

fall or injury.  Patient also denies any pleuritic chest pain lower extremity 

pain or swelling and no recent long distance travel protracted bedrest or 

surgical procedure.





[facility]


Past Medical History


*** Narrative Medical


Review of past medical record and nursing notes; hypothyroidism lupus 

dyslipidemia hypertension COPD cirrhosis with portal procedure with partial 

pancreatectomy splenectomy recent (L) elbow and (L) wrist surgery also 

prostatectomy; tobacco use


Hx Anticoagulant Therapy:  No


Arthritis:  Yes


Autoimmune Disease:  Yes (possible LUPUS)


Cancer:  Yes (ABDOMINAL, right hand middle finger REMOVED)


Cardiovascular Problems:  No


Chemotherapy:  No


Cirrhosis:  Yes


COPD:  Yes


Cerebrovascular Accident:  No


Diabetes:  No


Diminished Hearing:  No


GERD:  Yes


Genitourinary:  No


Hepatitis:  No


Hiatal Hernia:  Yes


Hypertension:  Yes


Inguinal Hernia:  Yes (repaired)


Reproductive:  No


Respiratory:  Yes (COPD)


Immunizations Current:  Yes


Thyroid Disease:  Yes


PNEUMOCCOCAL Vaccine (Year):  2011





Past Surgical History


Abdominal Surgery:  Yes (WHIPPLE DID INCLUDE SPLENECTOMY,PART OF PANCREAS,ALSO 

HAD HERNIA REPAIR)


Appendectomy:  Yes


Cholecystectomy:  Yes


Genitourinary Surgery:  Yes (PROSTATE SURGERY)


Neurologic Surgery:  Yes (cervical spine with plates and screws)


Pacemaker:  No


Other Surgery:  Yes (CANCEROUS SKIN LESIONS REMOVED FROM UPPER BACK AND ARMS 

2014)





Social History


Alcohol Use:  No


Tobacco Use:  Yes (1 ppd)


Substance Use:  No





Allergies-Medications


(Allergen,Severity, Reaction):  


Coded Allergies:  


     No Known Allergies (Unverified  Allergy, Unknown, 6/18/18)


Reported Meds & Prescriptions





Reported Meds & Active Scripts


Active


Reported


Synthroid (Levothyroxine Sodium) 50 Mcg Tab 50 Mcg PO DAILY








Review of Systems


Except as stated in HPI:  all other systems reviewed are Neg


General / Constitutional:  No: Fever, Chills


HENT:  No: Congestion


Cardiovascular:  Positive: Chest Pain or Discomfort


Respiratory:  No: Shortness of Breath


Gastrointestinal:  Positive: Nausea, No: Abdominal Pain


Genitourinary:  No: Flank Pain


Musculoskeletal:  No: Edema, Pain


Skin:  No Rash


Neurologic:  Positive: Dizziness, No: Weakness, Syncope, Focal Abnormalities, 

Coordination Problem, Headache, Change in Mentation, Slurred Speech


Psychiatric:  No: Anxiety


Hematologic/Lymphatic:  No: Easy Bruising





Physical Exam


Narrative


GENERAL: Well-developed well-nourished male no acute distress no respiratory 

distress.


SKIN: Warm and dry.


HEAD: Normocephalic.


EYES: No scleral icterus. No injection or drainage. 


NECK: Supple, trachea midline. No JVD or lymphadenopathy.


CARDIOVASCULAR: Regular rate and rhythm without murmurs, gallops, or rubs. 


RESPIRATORY: Breath sounds equal bilaterally. No accessory muscle use.


GASTROINTESTINAL: Abdomen soft, non-tender, nondistended. 


MUSCULOSKELETAL: No cyanosis, or edema. 


BACK: Nontender without obvious deformity. No CVA tenderness.








Data


Data


Last Documented VS





Vital Signs








  Date Time  Temp Pulse Resp B/P (MAP) Pulse Ox O2 Delivery O2 Flow Rate FiO2


 


6/18/18 22:32  68 18 114/65 (81) 97 Room Air  








Orders





 Orders


Electrocardiogram (6/18/18 20:22)


Basic Metabolic Panel (Bmp) (6/18/18 20:22)


Ckmb (Isoenzyme) Profile (6/18/18 20:22)


Complete Blood Count With Diff (6/18/18 20:22)


Magnesium (Mg) (6/18/18 20:22)


Prothrombin Time / Inr (Pt) (6/18/18 20:22)


Act Partial Throm Time (Ptt) (6/18/18 20:22)


Troponin I (6/18/18 20:22)


Ecg Monitoring (6/18/18 20:22)


Bilateral Bp Monitoring (6/18/18 20:22)


Iv Access Insert/Monitor (6/18/18 20:22)


Oximetry (6/18/18 20:22)


Oxygen Administration (6/18/18 20:22)


Aspirin Chew (Aspirin Chew) (6/18/18 20:30)


Sodium Chloride 0.9% Flush (Ns Flush) (6/18/18 20:30)


Nitroglycerin Sl (Nitrostat Sl) (6/18/18 20:30)


Chest, Pa & Lat (6/18/18 20:22)


Sodium Chlor 0.9% 1000 Ml Inj (Ns 1000 M (6/18/18 20:30)


Ondansetron  Odt (Zofran  Odt) (6/18/18 20:30)


CKMB (6/18/18 20:20)


CKMB% (6/18/18 20:20)


Ondansetron  Odt (Zofran  Odt) (6/18/18 21:30)


Hepatic Functional Panel (6/18/18 20:20)


D-Dimer (6/18/18 20:20)


Morphine Inj (Morphine Inj) (6/18/18 22:00)


Ct Pulmonary Angiogram (6/18/18 )


Iohexol 350 Inj (Omnipaque 350 Inj) (6/18/18 23:33)


Troponin I (6/19/18 00:04)


Ckmb (Isoenzyme) Profile (6/19/18 00:04)


Electrocardiogram (6/19/18 )


Admit Order (Ed Use Only) (6/19/18 )


Cardiac Monitor / Telemetry KYLE.Q8H (6/19/18 00:18)


Activity Oob With Assistance (6/19/18 00:18)


Notify Dr: Other (6/19/18 00:18)


Place In Observation (6/19/18 00:16)


Activity Bed Rest With Brp (6/19/18 00:16)


Vital Signs (Adult) Q4H (6/19/18 00:16)


Cardiac Rhythm .As Directed (6/19/18 00:16)


Notify Dr: Other .PRN (6/19/18 00:16)


Notify Dr. Parameters (6/19/18 00:16)


Resp Oxygen Nasal Cannula (6/19/18 )


Diet Npo (6/19/18 Breakfast)


Ckmb (Isoenzyme) Profile (6/19/18 03:00)


Troponin I (6/19/18 03:00)


Electrocardiogram (6/19/18 03:00)


^ Obtain (6/19/18 00:16)


Sodium Chlor 0.9% 1000 Ml Inj (Ns 1000 M (6/19/18 00:16)


Sodium Chloride 0.9% Flush (Ns Flush) (6/19/18 00:30)


Sodium Chloride 0.9% Flush (Ns Flush) (6/19/18 09:00)


Acetaminophen (Tylenol) (6/19/18 00:30)


Acetamin-Hydrocod 325-7.5 Mg (Norco  7.5 (6/19/18 00:30)


Morphine Inj (Morphine Inj) (6/19/18 00:30)


Ondansetron Inj (Zofran Inj) (6/19/18 00:30)


Cardiac Monitor / Telemetry KYLE.Q8H (6/19/18 00:16)


Heparin Inj (Heparin Inj) (6/19/18 06:00)





Labs





Laboratory Tests








Test


  6/18/18


20:20 6/19/18


00:16


 


White Blood Count 14.4 TH/MM3  


 


Red Blood Count 4.61 MIL/MM3  


 


Hemoglobin 14.9 GM/DL  


 


Hematocrit 43.7 %  


 


Mean Corpuscular Volume 94.8 FL  


 


Mean Corpuscular Hemoglobin 32.4 PG  


 


Mean Corpuscular Hemoglobin


Concent 34.2 % 


  


 


 


Red Cell Distribution Width 12.1 %  


 


Platelet Count 279 TH/MM3  


 


Mean Platelet Volume 8.4 FL  


 


Neutrophils (%) (Auto) 24.3 %  


 


Lymphocytes (%) (Auto) 65.4 %  


 


Monocytes (%) (Auto) 8.0 %  


 


Eosinophils (%) (Auto) 1.7 %  


 


Basophils (%) (Auto) 0.6 %  


 


Neutrophils # (Auto) 3.5 TH/MM3  


 


Lymphocytes # (Auto) 9.4 TH/MM3  


 


Monocytes # (Auto) 1.2 TH/MM3  


 


Eosinophils # (Auto) 0.2 TH/MM3  


 


Basophils # (Auto) 0.1 TH/MM3  


 


CBC Comment AUTO DIFF  


 


Differential Total Cells


Counted 100 


  


 


 


Neutrophils % (Manual) 26 %  


 


Lymphocytes % 66 %  


 


Monocytes % 7 %  


 


Eosinophils % 1 %  


 


Neutrophils # (Manual) 3.7 TH/MM3  


 


Differential Comment


  FINAL DIFF


MANUAL 


 


 


Platelet Estimate NORMAL  


 


Platelet Morphology Comment NORMAL  


 


Red Cell Morphology Comment NORMAL  


 


Prothrombin Time 10.5 SEC  


 


Prothromb Time International


Ratio 1.0 RATIO 


  


 


 


Activated Partial


Thromboplast Time 26.2 SEC 


  


 


 


D-Dimer Quantitative (PE/DVT) 1.27 MG/L FEU  


 


Blood Urea Nitrogen 11 MG/DL  


 


Creatinine 1.10 MG/DL  


 


Random Glucose 100 MG/DL  


 


Total Protein 8.2 GM/DL  


 


Albumin 3.6 GM/DL  


 


Calcium Level 8.8 MG/DL  


 


Magnesium Level 2.0 MG/DL  


 


Alkaline Phosphatase 110 U/L  


 


Aspartate Amino Transf


(AST/SGOT) 34 U/L 


  


 


 


Alanine Aminotransferase


(ALT/SGPT) 47 U/L 


  


 


 


Total Bilirubin 0.8 MG/DL  


 


Direct Bilirubin 0.2 MG/DL  


 


Sodium Level 138 MEQ/L  


 


Potassium Level 3.5 MEQ/L  


 


Chloride Level 105 MEQ/L  


 


Carbon Dioxide Level 27.8 MEQ/L  


 


Anion Gap 5 MEQ/L  


 


Estimat Glomerular Filtration


Rate 68 ML/MIN 


  


 


 


Indirect Bilirubin 0.6 MG/DL  


 


Total Creatine Kinase 123 U/L  


 


Creatine Kinase MB 2.6 NG/ML  


 


Troponin I


  LESS THAN 0.02


NG/ML 


 











[facility]


Medical Decision Making


Medical Screen Exam Complete:  Yes


Emergency Medical Condition:  Yes


Medical Record Reviewed:  Yes


Interpretation(s)


EKG: Normal sinus rhythm rate 88 no acute ST elevation or injury pattern left 

axis deviation moderate intraventricular conduction delay unifocal PVC's


CK: 123, not elevated; troponin I: less than 0.02, not elevated


d-d: 1.27, elevated


Last Impressions








Chest X-Ray 6/18/18 2022 Signed





Impressions: 





 CONCLUSION: 





 No focal consolidation or effusion. No pneumothorax.





  





 


 


CT Angiography 6/18/18 0000 Signed





Impressions: 





 CONCLUSION:





 1.  This study is negative for pulmonary embolism. Diffuse emphysema





  





 





CBC & BMP Diagram


6/18/18 20:20








Total Protein 8.2, Albumin 3.6, Calcium Level 8.8, Magnesium Level 2.0, 

Alkaline Phosphatase 110, Aspartate Amino Transf (AST/SGOT) 34, Alanine 

Aminotransferase (ALT/SGPT) 47, Total Bilirubin 0.8, Direct Bilirubin 0.2








Vital Signs








  Date Time  Temp Pulse Resp B/P (MAP) Pulse Ox O2 Delivery O2 Flow Rate FiO2


 


6/18/18 22:32  68 18 114/65 (81) 97 Room Air  


 


6/18/18 21:13  79 18 131/83 (99) 97 Room Air  


 


6/18/18 20:38  79 18  96 Room Air  


 


6/18/18 20:36  84 18 195/95 (128) 96 Room Air  





    181/93 (122)    


 


6/18/18 20:35   18  96 Room Air  


 


6/18/18 20:35     96 Room Air  








Differential Diagnosis


Chest pain, atypical chest pain, ACS, MI, arrhythmia, electrolyte disturbance, 

near syncope, TIA


Narrative Course


Patient placed on cardiac monitor IV access obtained specimens collected and 

sent for resulting patient administered aspirin 160 mg 1 dose as well as 

subungual nitroglycerin 0.4 mg sublingual every 5 minutes per protocol


Patient with only minimal pain response after sublingual nitroglycerin pain 

decreased from 4/10 intensity of 3-3.5/10 in intensity now describes pain as 

sharp and worsened with deep respiratory effort therefore d-dimer added


D-dimer is elevated at 1.27 in view of complaint of pleuritic chest pain that 

has been intermittent for the past 2 weeks and progressively worsening we will 

proceed with CT pulmonary angiogram


CT pulmonary angiogram is negative for PE or acute process otherwise just 

changes consistent with COPD consistent with his history of chronic tobacco use.


Patient states he is pain-free has received 2 mg of morphine IV


Patient's case discussed with on-call medicine physician for observation 

admission for serial cardiac enzymes for chest pain procedure protocol.  

Patient has been identified to have an abnormal EKG by his primary [facility] 

[facility] and here has intraventricular conduction delay 

occasional unifocal PVCs but no acute ST elevation for set of cardiac enzymes 

are negative.  We will proceed with chest pain center protocol for further 

evaluation of patient's chest pain in view of his factor profile of male over 

the age of 40 with heavy tobacco use and family history of cardiac disease.  

Patient is agreeable to observation admission.  Patient was scheduled to have 

stress test in July anyway this will proceed and expedite his workup for 

possible cardiac related chest pain.  Patient's case discussed with on-call 

medicine physician Dr. Sainz.  Second set of enzymes has already been ordered 

along with second EKG


Critical Care Narrative


Aggregate critical care time was 35 minutes. Time to perform other separately 

billable procedures was not included in the critical care time. My time did not 

include minutes spent treating any other patients simultaneously or on 

activities that did not directly contribute to the patient's treatment.  





The services I provided to this patient were to treat and/or prevent clinically 

significant deterioration that could result in: Myocardial infarction, 

arrhythmia, death





I provided critical care services requiring my management, as noted below:


Chart data review, documentation time, medication orders and management, vital 

sign assessments/reviewing monitor data, ordering and reviewing lab tests, 

ordering and interpreting/reviewing x-rays and diagnostic studies, care of the 

patient and discussion of the patient with the admitting physicians.





Physician Communication


Physician Communication


discussed with [facility] MD Dr Sainz ---[facility]





Diagnosis





 Primary Impression:  


 Chest pain


 Additional Impression:  


 History of COPD





Admitting Information


Admitting Physician Requests:  Observation











Sylvia Garner MD Jun 18, 2018 20:31

## 2018-06-19 VITALS
SYSTOLIC BLOOD PRESSURE: 132 MMHG | OXYGEN SATURATION: 93 % | HEART RATE: 69 BPM | RESPIRATION RATE: 16 BRPM | DIASTOLIC BLOOD PRESSURE: 76 MMHG | TEMPERATURE: 96.2 F

## 2018-06-19 VITALS — HEART RATE: 63 BPM

## 2018-06-19 VITALS
DIASTOLIC BLOOD PRESSURE: 72 MMHG | TEMPERATURE: 96.8 F | RESPIRATION RATE: 20 BRPM | OXYGEN SATURATION: 94 % | SYSTOLIC BLOOD PRESSURE: 144 MMHG | HEART RATE: 70 BPM

## 2018-06-19 VITALS
SYSTOLIC BLOOD PRESSURE: 161 MMHG | RESPIRATION RATE: 20 BRPM | TEMPERATURE: 96 F | OXYGEN SATURATION: 93 % | HEART RATE: 83 BPM | DIASTOLIC BLOOD PRESSURE: 90 MMHG

## 2018-06-19 VITALS — OXYGEN SATURATION: 95 %

## 2018-06-19 LAB
BASOPHILS # BLD AUTO: 0.1 TH/MM3 (ref 0–0.2)
BASOPHILS NFR BLD: 0.7 % (ref 0–2)
EOSINOPHIL # BLD: 0.3 TH/MM3 (ref 0–0.4)
EOSINOPHIL NFR BLD: 3.5 % (ref 0–4)
ERYTHROCYTE [DISTWIDTH] IN BLOOD BY AUTOMATED COUNT: 12.3 % (ref 11.6–17.2)
HCT VFR BLD CALC: 40.1 % (ref 39–51)
HGB BLD-MCNC: 13.7 GM/DL (ref 13–17)
LYMPHOCYTES # BLD AUTO: 4.8 TH/MM3 (ref 1–4.8)
LYMPHOCYTES NFR BLD AUTO: 51.4 % (ref 9–44)
MCH RBC QN AUTO: 32.7 PG (ref 27–34)
MCHC RBC AUTO-ENTMCNC: 34.2 % (ref 32–36)
MCV RBC AUTO: 95.6 FL (ref 80–100)
MONOCYTE #: 0.8 TH/MM3 (ref 0–0.9)
MONOCYTES NFR BLD: 8.9 % (ref 0–8)
NEUTROPHILS # BLD AUTO: 3.3 TH/MM3 (ref 1.8–7.7)
NEUTROPHILS NFR BLD AUTO: 35.5 % (ref 16–70)
PLATELET # BLD: 263 TH/MM3 (ref 150–450)
PMV BLD AUTO: 8.4 FL (ref 7–11)
RBC # BLD AUTO: 4.19 MIL/MM3 (ref 4.5–5.9)
TROPONIN I SERPL-MCNC: (no result) NG/ML (ref 0.02–0.05)
TROPONIN I SERPL-MCNC: (no result) NG/ML (ref 0.02–0.05)
WBC # BLD AUTO: 9.3 TH/MM3 (ref 4–11)

## 2018-06-19 RX ADMIN — HYDROCODONE BITARTRATE AND ACETAMINOPHEN PRN TAB: 7.5; 325 TABLET ORAL at 01:16

## 2018-06-19 RX ADMIN — PHENYTOIN SODIUM SCH MLS/HR: 50 INJECTION INTRAMUSCULAR; INTRAVENOUS at 01:09

## 2018-06-19 RX ADMIN — HYDROCODONE BITARTRATE AND ACETAMINOPHEN PRN TAB: 7.5; 325 TABLET ORAL at 06:00

## 2018-06-19 RX ADMIN — PHENYTOIN SODIUM SCH MLS/HR: 50 INJECTION INTRAMUSCULAR; INTRAVENOUS at 00:33

## 2018-06-19 NOTE — EKG
Date Performed: 2018       Time Performed: 20:14:38

 

PTAGE:      62 years

 

EKG:      Sinus rhythm 

 

 WITH OCCASIONAL VENTRICULAR PREMATURE COMPLEXES BORDERLINE LEFT AXIS DEVIATION MODERATE INTRAVENTRIC
ULAR CONDUCTION DELAY MINIMAL ST DEPRESSION BORDERLINE ECG Since 

 

 PREVIOUS TRACING            , no significant change noted PREVIOUS TRACIN2017 13.29

 

DOCTOR:   Demetra Wild  Interpretating Date/Time  2018 15:23:39

## 2018-06-19 NOTE — EKG
Date Performed: 2018       Time Performed: 00:48:04

 

PTAGE:      62 years

 

EKG:      Sinus rhythm 

 

 BORDERLINE LEFT AXIS DEVIATION MODERATE INTRAVENTRICULAR CONDUCTION DELAY BORDERLINE ECG Since 

 

 PREVIOUS TRACING            , no significant change noted PREVIOUS TRACIN2018 20.14

 

DOCTOR:   Demetra Wild  Interpretating Date/Time  2018 15:23:57

## 2018-06-19 NOTE — HHI.DCPOC
Discharge Care Plan


Diagnosis:  


(1) Chest pain


Goals to Promote Your Health


* To prevent worsening of your condition and complications


* To maintain your health at the optimal level


Directions to Meet Your Goals


*** Take your medications as prescribed


*** Follow your dietary instruction


*** Follow activity as directed








*** Keep your appointments as scheduled


*** Take your immunizations and boosters as scheduled


*** If your symptoms worsen call your PCP, if no PCP go to Urgent Care Center 

or Emergency Room***


*** Smoking is Dangerous to Your Health. Avoid second hand smoke***


***Call the 24-hour hour crisis hotline for domestic abuse at 1-284.496.8492***











Janusz Schneider Jun 19, 2018 12:00

## 2018-06-19 NOTE — HHI.HP
__________________________________________________





[facility]


Service


[facility]


Primary Care Physician


[facility]


Admission Diagnosis





chest pain


Diagnoses:  


(1) Chest pain


Diagnosis:  Principal





Chief Complaint:  


Chest pain


Travel History


International Travel<30 Days:  No


Contact w/Intl Traveler <30 Da:  No


Traveled to Known Affected Are:  No


History of Present Illness


62-year-old  male with known history of chronic obstructive pulmonary 

disease, cirrhosis, hypothyroidism who presented to the emergency department 

because of chest discomfort.  Patient indicates that he been experiencing chest 

discomfort for 2 weeks.  Patient states that the pain can happen at entering 

time and usually a 7/10 on a pain scale lasting for 1 hour and self resolving.  

Patient indicates that the pain is usually accompanied by dizziness.  Patient 

will get dizzy and then he will have a hammering type pain in his chest which 

happens approximately 2-3 times a day.  He indicates that whenever he takes a 

deep breath it causes pain between his shoulder blades.  Otherwise no radiation 

to the neck, shoulder, arms.  Denies any nausea, vomiting, diaphoresis.  

Patient states that he got the pain again yesterday so he decided come to the 

emergency department for evaluation.  Patient was given nitro and morphine 

without complete resolution.  The pain was reduced down to 4/10 on a pain 

scale.  Patient does not follow with a cardiologist outpatient setting.  His 

last stress test was 2 years ago.  Patient was evaluated emergency department 

and is recommended by the ER physician that the patient be observed for further 

evaluation.





Review of Systems


Constitutional:  COMPLAINS OF: Dizziness


Cardiovascular:  COMPLAINS OF: Chest pain


Except as stated in HPI:  all other systems reviewed are Neg





Past Family Social History


Past Medical History


Chronic obstructive pulmonary disease


Hypothyroidism


History of pancreatic and splenic mass


Past Surgical History


Whipple procedure


Cervical spine surgery


Left wrist surgery


Left elbow surgery


Abdominal hernia repair


Right hand third digit partial amputation


Appendectomy


Cholecystectomy


Reported Medications





Reported Meds & Active Scripts


Active


Reported


Synthroid (Levothyroxine Sodium) 50 Mcg Tab 50 Mcg PO DAILY


Allergies:  


Coded Allergies:  


     No Known Allergies (Unverified  Allergy, Unknown, 18)


Family History


Reviewed and significant for mother  at age 68 from dementia.  Father 

 at age 65 from myocardial infarction


Social History


Patient continues smoke 1 pack of cigarettes a day since his 15 years old.  

Patient states he quit using alcohol 11 years ago.  Denies any illicit drug





Physical Exam


Vital Signs





Vital Signs








  Date Time  Temp Pulse Resp B/P (MAP) Pulse Ox O2 Delivery O2 Flow Rate FiO2


 


18 04:00 96.8 70 20 144/72 (96) 94   


 


18 01:16 96.0 83 20 161/90 (113) 93   


 


18 01:01        


 


18 01:00  63      


 


18 00:45     95   21


 


18 22:32  68 18 114/65 (81) 97 Room Air  


 


18 21:13  79 18 131/83 (99) 97 Room Air  


 


18 20:38  79 18  96 Room Air  


 


18 20:36  84 18 195/95 (128) 96 Room Air  





    181/93 (122)    


 


18 20:35   18  96 Room Air  


 


18 20:35     96 Room Air  








Physical Exam


GENERAL: Well-developed, well-nourished, in no acute distress. alert and 

orientated


HEENT: Head is normocephalic without any lesions or masses noted. Facial 

features are symmetric. Eyes: Pupils equal round reactive to light. Extraocular 

muscles are intact. Conjunctivae were clear. Oropharyngeal: Pharynx without any 

erythema edema. Tongue is midline without deviation. Buccal mucosa is moist 

without any masses or lesions


NECK: Supple without any masses. Trachea midline no deviation. No JVD, no 

bruits are appreciated


CARDIAC: Regular rhythm, regular rate. S1/S2 are heard. No murmurs gallops or 

rubs.


LUNGS: Clear to auscultation bilaterally. No wheeze, rhonchi or rales. No use 

of accessory muscles on inspiration or expiration.


ABDOMEN: Soft, nontender. Nondistended. Bowel sounds heard in all 4 quadrants. 

No organomegaly or masses. Negative rebound, negative guarding


EXTREMITIES: No edema, pulses are equal bilaterally. No cyanosis or clubbing


NEUROLOGY: Mood and affect appear appropriate. Cranial nerves II through XII 

grossly intact. Muscle strength 5/5 in upper and lower extremities bilaterally. 

Deep tendon reflexes are 2+ in upper and lower extremities bilaterally.


Laboratory





Laboratory Tests








Test


  18


20:20 6/19/18


00:16 18


03:15 18


08:12


 


White Blood Count 14.4    9.3 


 


Red Blood Count 4.61    4.19 


 


Hemoglobin 14.9    13.7 


 


Hematocrit 43.7    40.1 


 


Mean Corpuscular Volume 94.8    95.6 


 


Mean Corpuscular Hemoglobin 32.4    32.7 


 


Mean Corpuscular Hemoglobin


Concent 34.2 


  


  


  34.2 


 


 


Red Cell Distribution Width 12.1    12.3 


 


Platelet Count 279    263 


 


Mean Platelet Volume 8.4    8.4 


 


Neutrophils (%) (Auto) 24.3    35.5 


 


Lymphocytes (%) (Auto) 65.4    51.4 


 


Monocytes (%) (Auto) 8.0    8.9 


 


Eosinophils (%) (Auto) 1.7    3.5 


 


Basophils (%) (Auto) 0.6    0.7 


 


Neutrophils # (Auto) 3.5    3.3 


 


Lymphocytes # (Auto) 9.4    4.8 


 


Monocytes # (Auto) 1.2    0.8 


 


Eosinophils # (Auto) 0.2    0.3 


 


Basophils # (Auto) 0.1    0.1 


 


CBC Comment AUTO DIFF    DIFF FINAL 


 


Differential Total Cells


Counted 100 


  


  


  


 


 


Neutrophils % (Manual) 26    


 


Lymphocytes % 66    


 


Monocytes % 7    


 


Eosinophils % 1    


 


Neutrophils # (Manual) 3.7    


 


Differential Comment


  FINAL DIFF


MANUAL 


  


   


 


 


Platelet Estimate NORMAL    


 


Platelet Morphology Comment NORMAL    


 


Red Cell Morphology Comment NORMAL    


 


Prothrombin Time 10.5    


 


Prothromb Time International


Ratio 1.0 


  


  


  


 


 


Activated Partial


Thromboplast Time 26.2 


  


  


  


 


 


D-Dimer Quantitative (PE/DVT) 1.27    


 


Blood Urea Nitrogen 11    


 


Creatinine 1.10    


 


Random Glucose 100    


 


Total Protein 8.2    


 


Albumin 3.6    


 


Calcium Level 8.8    


 


Magnesium Level 2.0    


 


Alkaline Phosphatase 110    


 


Aspartate Amino Transf


(AST/SGOT) 34 


  


  


  


 


 


Alanine Aminotransferase


(ALT/SGPT) 47 


  


  


  


 


 


Total Bilirubin 0.8    


 


Direct Bilirubin 0.2    


 


Sodium Level 138    


 


Potassium Level 3.5    


 


Chloride Level 105    


 


Carbon Dioxide Level 27.8    


 


Anion Gap 5    


 


Estimat Glomerular Filtration


Rate 68 


  


  


  


 


 


Indirect Bilirubin 0.6    


 


Total Creatine Kinase 123  95  95  


 


Creatine Kinase MB 2.6    


 


Troponin I LESS THAN 0.02  LESS THAN 0.02  LESS THAN 0.02  








Result Diagram:  


18 0812                                                                   

             18





Imaging





Last Impressions








Chest X-Ray 18 Signed





Impressions: 





 CONCLUSION: 





 No focal consolidation or effusion. No pneumothorax.





  





 


 


CT Angiography 18 0000 Signed





Impressions: 





 CONCLUSION:





 1.  This study is negative for pulmonary embolism. Diffuse emphysema





  





 











Septic Shock Reassessment


Septic shock perfusion:  reassessment completed





Caprini VTE Risk Assessment


Caprini VTE Risk Assessment:  Mod/High Risk (score >= 2)


Caprini Risk Assessment Model











 Point Value = 1          Point Value = 2  Point Value = 3  Point Value = 5


 


Age 41-60


Minor surgery


BMI > 25 kg/m2


Swollen legs


Varicose veins


Pregnancy or postpartum


History of unexplained or recurrent


   spontaneous 


Oral contraceptives or hormone


   replacement


Sepsis (< 1 month)


Serious lung disease, including


   pneumonia (< 1 month)


Abnormal pulmonary function


Acute myocardial infarction


Congestive heart failure (< 1 month)


History of inflammatory bowel disease


Medical patient at bed rest Age 61-74


Arthroscopic surgery


Major open surgery (> 45 min)


Laparoscopic surgery (> 45 min)


Malignancy


Confined to bed (> 72 hours)


Immobilizing plaster cast


Central venous access Age >= 75


History of VTE


Family history of VTE


Factor V Leiden


Prothrombin 43315R


Lupus anticoagulant


Anticardiolipin antibodies


Elevated serum homocysteine


Heparin-induced thrombocytopenia


Other congenital or acquired


   thrombophilia Stroke (< 1 month)


Elective arthroplasty


Hip, pelvis, or leg fracture


Acute spinal cord injury (< 1 month)








Prophylaxis Regimen











   Total Risk


Factor Score Risk Level Prophylaxis Regimen


 


0-1      Low Early ambulation


 


2 Moderate Order ONE of the following:


*Sequential Compression Device (SCD)


*Heparin 5000 units SQ BID


 


3-4 Higher Order ONE of the following medications:


*Heparin 5000 units SQ TID


*Enoxaparin/Lovenox 40 mg SQ daily (WT < 150 kg, CrCl > 30 mL/min)


*Enoxaparin/Lovenox 30 mg SQ daily (WT < 150 kg, CrCl > 10-29 mL/min)


*Enoxaparin/Lovenox 30 mg SQ BID (WT < 150 kg, CrCl > 30 mL/min)


AND/OR


*Sequential Compression Device (SCD)


 


5 or more Highest Order ONE of the following medications:


*Heparin 5000 units SQ TID (Preferred with Epidurals)


*Enoxaparin/Lovenox 40 mg SQ daily (WT < 150 kg, CrCl > 30 mL/min)


*Enoxaparin/Lovenox 30 mg SQ daily (WT < 150 kg, CrCl > 10-29 mL/min)


*Enoxaparin/Lovenox 30 mg SQ BID (WT < 150 kg, CrCl > 30 mL/min)


AND


*Sequential Compression Device (SCD)











Assessment and Plan


Assessment and Plan


Chest pain, atypical


-Patient does have increased risk factors include age, male, tobacco use, 

family history of heart disease


-Patient has been ruled out for acute coronary event with serial cardiac 

enzymes that are negative


-Serial EKG shows sinus rhythm with occasional PVC, no signs of any ST 

elevations or T-wave inversions


-Myocardial perfusion study was performed and indicated no signs of ischemia, 

normal testing, low risk


-Patient status post nitroglycerin/morphine without complete relief





Leukocytosis, resolved


-Unknown etiology at this time, could have been secondary to concentration





Chronic obstructive pulmonary disease


-Patient with good O2 saturations on room air





DVT prevention


-Subcutaneous heparin





Discharge disposition





Discharge home in stable condition





Activity: Ad amparo.





Diet: Regular diet





Medication per medication reconciliation





Follow-up with primary medical doctor in 1 week











Janusz Schneider 2018 09:23

## 2018-06-19 NOTE — TR
Date Performed: 06/19/2018       Time Performed: 10:57:58

 

DOCTOR:      Demetra Wild 

 

DRUG LIST:     

CLINICAL HISTORY:     

REASON FOR TEST:     

REASON FOR ENDING:     

OBSERVATION:     

CONCLUSION:      Lexiscan stress test was performed under standard four minute protocol.  Radionuclid
e was injected one minute prior to ending the test. No electrocardiographic abormalities were present
 to suggest ischemia. Nuclear imaging and interpretation are pending.

COMMENTS:      no ischemia

## 2018-06-19 NOTE — RADRPT
EXAM DATE:  6/19/2018 11:47 AM EDT

AGE/SEX:        62 years / Male



INDICATIONS:Angina. Abnormal EKG Retrosternal chest pain.   

 

CLINICAL DATA:  This is the patient's initial encounter. Patient reports that signs and symptoms have
 been present for 2 weeks and indicates a pain score of 4/10. 

                                                                          

MEDICAL/SURGICAL HISTORY:       Chronic obstructive pulmonary disease. Prostatectomy.  Inguinal herni
a repair. Whipple, right hand and back surgery.



COMPARISON:      No prior exams available for comparison. 



 

DOSE:  8.1 mCi Tc 99m Myoview at rest

              26.4  mCi Tc99m-Myoview at rest

              0.4 mg Lexiscan



STRESS SYMPTOMS: Short of breath.







EJECTION FRACTION:  48 %



TECHNIQUE:  The patient underwent pharmacologic stress with infusion of prescribed dose.  Continuous 
ECG tracing was monitored during stress.  Gated SPECT imaging was performed after stress and conventi
onal SPECT imaging was performed at rest.  The examination was performed on a SPECT/CT scanner, both 
attenuation and non-corrected datasets were reviewed.



FINDINGS:  

Distribution:  The maximum perfused segment at stress is in the anterior wall.

Perfusion Study:   The pattern of perfusion at stress is within normal limits.   

Gated Study:  There are intact wall motion and wall thickening without hypokinetic or dyskinetic segm
ents.  The ejection fraction is calculated at 48%.  



RISK CATEGORY:  Low (<1% Annual Motality Rate)



CONCLUSION: 

1.  Unremarkable myocardial perfusion exam.



Electronically signed by: Ariel Noble MD  6/19/2018 11:57 AM EDT